# Patient Record
Sex: FEMALE | Race: BLACK OR AFRICAN AMERICAN | Employment: UNEMPLOYED | URBAN - METROPOLITAN AREA
[De-identification: names, ages, dates, MRNs, and addresses within clinical notes are randomized per-mention and may not be internally consistent; named-entity substitution may affect disease eponyms.]

---

## 2017-02-02 ENCOUNTER — OFFICE VISIT (OUTPATIENT)
Dept: OBGYN CLINIC | Age: 27
End: 2017-02-02

## 2017-02-02 ENCOUNTER — HOSPITAL ENCOUNTER (OUTPATIENT)
Dept: LAB | Age: 27
Discharge: HOME | End: 2017-02-02
Payer: COMMERCIAL

## 2017-02-02 VITALS
SYSTOLIC BLOOD PRESSURE: 125 MMHG | HEIGHT: 67 IN | DIASTOLIC BLOOD PRESSURE: 79 MMHG | BODY MASS INDEX: 23.86 KG/M2 | HEART RATE: 86 BPM | WEIGHT: 152 LBS

## 2017-02-02 DIAGNOSIS — B96.89 BV (BACTERIAL VAGINOSIS): ICD-10-CM

## 2017-02-02 DIAGNOSIS — N76.0 BV (BACTERIAL VAGINOSIS): ICD-10-CM

## 2017-02-02 DIAGNOSIS — N89.8 VAGINAL DISCHARGE: ICD-10-CM

## 2017-02-02 DIAGNOSIS — Z01.419 WELL WOMAN EXAM WITH ROUTINE GYNECOLOGICAL EXAM: Primary | ICD-10-CM

## 2017-02-02 DIAGNOSIS — Z11.3 SCREENING FOR STD (SEXUALLY TRANSMITTED DISEASE): ICD-10-CM

## 2017-02-02 DIAGNOSIS — Z01.419 WELL WOMAN EXAM WITH ROUTINE GYNECOLOGICAL EXAM: ICD-10-CM

## 2017-02-02 LAB — KOH PREP SKIN, KOHPOCT: NORMAL

## 2017-02-02 PROCEDURE — 87340 HEPATITIS B SURFACE AG IA: CPT | Performed by: OBSTETRICS & GYNECOLOGY

## 2017-02-02 PROCEDURE — 36415 COLL VENOUS BLD VENIPUNCTURE: CPT | Performed by: OBSTETRICS & GYNECOLOGY

## 2017-02-02 PROCEDURE — 87389 HIV-1 AG W/HIV-1&-2 AB AG IA: CPT | Performed by: OBSTETRICS & GYNECOLOGY

## 2017-02-02 PROCEDURE — 87491 CHLMYD TRACH DNA AMP PROBE: CPT | Performed by: OBSTETRICS & GYNECOLOGY

## 2017-02-02 PROCEDURE — 86780 TREPONEMA PALLIDUM: CPT | Performed by: OBSTETRICS & GYNECOLOGY

## 2017-02-02 PROCEDURE — 86803 HEPATITIS C AB TEST: CPT | Performed by: OBSTETRICS & GYNECOLOGY

## 2017-02-02 RX ORDER — METRONIDAZOLE 500 MG/1
500 TABLET ORAL 2 TIMES DAILY
Qty: 14 TAB | Refills: 0 | Status: SHIPPED | OUTPATIENT
Start: 2017-02-02 | End: 2017-02-09

## 2017-02-02 NOTE — PATIENT INSTRUCTIONS
Well Visit, Ages 25 to 48: Care Instructions  Your Care Instructions  Physical exams can help you stay healthy. Your doctor has checked your overall health and may have suggested ways to take good care of yourself. He or she also may have recommended tests. At home, you can help prevent illness with healthy eating, regular exercise, and other steps. Follow-up care is a key part of your treatment and safety. Be sure to make and go to all appointments, and call your doctor if you are having problems. It's also a good idea to know your test results and keep a list of the medicines you take. How can you care for yourself at home? · Reach and stay at a healthy weight. This will lower your risk for many problems, such as obesity, diabetes, heart disease, and high blood pressure. · Get at least 30 minutes of physical activity on most days of the week. Walking is a good choice. You also may want to do other activities, such as running, swimming, cycling, or playing tennis or team sports. Discuss any changes in your exercise program with your doctor. · Do not smoke or allow others to smoke around you. If you need help quitting, talk to your doctor about stop-smoking programs and medicines. These can increase your chances of quitting for good. · Talk to your doctor about whether you have any risk factors for sexually transmitted infections (STIs). Having one sex partner (who does not have STIs and does not have sex with anyone else) is a good way to avoid these infections. · Use birth control if you do not want to have children at this time. Talk with your doctor about the choices available and what might be best for you. · Protect your skin from too much sun. When you're outdoors from 10 a.m. to 4 p.m., stay in the shade or cover up with clothing and a hat with a wide brim. Wear sunglasses that block UV rays. Even when it's cloudy, put broad-spectrum sunscreen (SPF 30 or higher) on any exposed skin.   · See a dentist one or two times a year for checkups and to have your teeth cleaned. · Wear a seat belt in the car. · Drink alcohol in moderation, if at all. That means no more than 2 drinks a day for men and 1 drink a day for women. Follow your doctor's advice about when to have certain tests. These tests can spot problems early. For everyone  · Cholesterol. Have the fat (cholesterol) in your blood tested after age 21. Your doctor will tell you how often to have this done based on your age, family history, or other things that can increase your risk for heart disease. · Blood pressure. Have your blood pressure checked during a routine doctor visit. Your doctor will tell you how often to check your blood pressure based on your age, your blood pressure results, and other factors. · Vision. Talk with your doctor about how often to have a glaucoma test.  · Diabetes. Ask your doctor whether you should have tests for diabetes. · Colon cancer. Have a test for colon cancer at age 48. You may have one of several tests. If you are younger than 48, you may need a test earlier if you have any risk factors. Risk factors include whether you already had a precancerous polyp removed from your colon or whether your parent, brother, sister, or child has had colon cancer. For women  · Breast exam and mammogram. Talk to your doctor about when you should have a clinical breast exam and a mammogram. Medical experts differ on whether and how often women under 50 should have these tests. Your doctor can help you decide what is right for you. · Pap test and pelvic exam. Begin Pap tests at age 24. A Pap test is the best way to find cervical cancer. The test often is part of a pelvic exam. Ask how often to have this test.  · Tests for sexually transmitted infections (STIs). Ask whether you should have tests for STIs. You may be at risk if you have sex with more than one person, especially if your partners do not wear condoms.   For men  · Tests for sexually transmitted infections (STIs). Ask whether you should have tests for STIs. You may be at risk if you have sex with more than one person, especially if you do not wear a condom. · Testicular cancer exam. Ask your doctor whether you should check your testicles regularly. · Prostate exam. Talk to your doctor about whether you should have a blood test (called a PSA test) for prostate cancer. Experts differ on whether and when men should have this test. Some experts suggest it if you are older than 39 and are -American or have a father or brother who got prostate cancer when he was younger than 72. When should you call for help? Watch closely for changes in your health, and be sure to contact your doctor if you have any problems or symptoms that concern you. Where can you learn more? Go to http://marga-amalia.info/. Enter P072 in the search box to learn more about \"Well Visit, Ages 25 to 48: Care Instructions. \"  Current as of: July 19, 2016  Content Version: 11.1  © 2892-6650 [employer]. Care instructions adapted under license by [employer] (which disclaims liability or warranty for this information). If you have questions about a medical condition or this instruction, always ask your healthcare professional. [address] any warranty or liability for your use of this information. Safer Sex: Care Instructions  Your Care Instructions  Safer sex is a way to reduce your risk of getting an infection spread through sex. It can also help prevent pregnancy. Most infections that are spread through sex, also called sexually transmitted infections or STIs, can be cured. But some can decrease your chances of getting pregnant if they are not treated early. Others, such as herpes, have no cure. And some, such as HIV, can be deadly. Several products can help you practice safer sex and reduce your chance of STIs.  One of the best is a condom. There are condoms for men and for women. The female condom is a tube of soft plastic with a closed end that is placed deep into the vagina. You can use a special rubber sheet (dental dam) for protection during oral sex. Latex gloves can keep your hands from touching blood, semen, or other body fluids that can carry infections. Remember that birth control methods such as diaphragms, IUDs, foams, and birth control pills do not stop you from getting STIs. Follow-up care is a key part of your treatment and safety. Be sure to make and go to all appointments, and call your doctor if you are having problems. Its also a good idea to know your test results and keep a list of the medicines you take. How can you care for yourself at home? · Think about getting shots to prevent hepatitis A and hepatitis B. These two diseases can be spread through sex. You also can get hepatitis A if you eat infected food. · Use condoms or female condoms each time and every time you have sex. · Learn the right way to use a male condom:  ¨ Condoms come in several sizes. Make sure you use the right size. A condom that is too small can break easily. A condom that is too big can slip off during sex. Use a new condom each time you have sex. ¨ Be careful not to poke a hole in the condom when you open the wrapper. ¨ Squeeze the tip of the condom to keep out air. ¨ Pull down the loose skin (foreskin) from the head of an uncircumcised penis. ¨ While squeezing the tip of the condom, unroll it all the way down to the base of the firm penis. ¨ Never use petroleum jelly (such as Vaseline), grease, hand lotion, baby oil, or anything with oil in it. These products can make holes in the condom. ¨ After sex, hold the condom on your penis as you remove your penis from your partner. This will keep semen from spilling out of the condom.   · Learn to use a female condom:  ¨ You can put in a female condom up to 8 hours before sex.  Nelson Milner the smaller ring at the closed end and insert it deep into the vagina. The larger ring at the open end should stay outside the vagina. ¨ During sex, make sure the penis goes into the condom. ¨ After the penis is removed, close the open end of the condom by twisting it. Remove the condom. · Do not use a female condom and male condom at the same time. · Do not have sex with anyone who has symptoms of an STI, such as sores on the genitals or mouth. The herpes virus that causes cold sores can spread to and from the penis and vagina. · Do not drink a lot of alcohol or use drugs before sex. This can cause you to let down your guard and not practice safer sex. · Having one sex partner (who does not have STIs and does not have sex with anyone else) is a sure way to avoid STIs. · Talk to your partner before you have sex. Find out if he or she has or is at risk for any STI. Keep in mind that a person may be able to spread an STI even if he or she does not have symptoms. You and your partner may want to get an HIV test. You should get tested again 6 months later. Where can you learn more? Go to http://marga-amalia.info/. Enter H331 in the search box to learn more about \"Safer Sex: Care Instructions. \"  Current as of: May 27, 2016  Content Version: 11.1  © 2058-1623 [employer]. Care instructions adapted under license by [employer] (which disclaims liability or warranty for this information). If you have questions about a medical condition or this instruction, always ask your healthcare professional. [place] [address] any warranty or liability for your use of this information. Bacterial Vaginosis: Care Instructions  Your Care Instructions    Bacterial vaginosis is a type of vaginal infection. It is caused by excess growth of certain bacteria that are normally found in the vagina.  Symptoms can include itching, swelling, pain when you urinate or have sex, and a gray or yellow discharge with a \"fishy\" odor. It is not considered an infection that is spread through sexual contact. Although symptoms can be annoying and uncomfortable, bacterial vaginosis does not usually cause other health problems. However, if you have it while you are pregnant, it can cause complications. While the infection may go away on its own, most doctors use antibiotics to treat it. You may have been prescribed pills or vaginal cream. With treatment, bacterial vaginosis usually clears up in 5 to 7 days. Follow-up care is a key part of your treatment and safety. Be sure to make and go to all appointments, and call your doctor if you are having problems. It's also a good idea to know your test results and keep a list of the medicines you take. How can you care for yourself at home? · Take your antibiotics as directed. Do not stop taking them just because you feel better. You need to take the full course of antibiotics. · Do not eat or drink anything that contains alcohol if you are taking metronidazole (Flagyl). · Keep using your medicine if you start your period. Use pads instead of tampons while using a vaginal cream or suppository. Tampons can absorb the medicine. · Wear loose cotton clothing. Do not wear nylon and other materials that hold body heat and moisture close to the skin. · Do not scratch. Relieve itching with a cold pack or a cool bath. · Do not wash your vaginal area more than once a day. Use plain water or a mild, unscented soap. Do not douche. When should you call for help? Watch closely for changes in your health, and be sure to contact your doctor if:  · You have unexpected vaginal bleeding. · You have a fever. · You have new or increased pain in your vagina or pelvis. · You are not getting better after 1 week. · Your symptoms return after you finish the course of your medicine. Where can you learn more?   Go to http://marga-amalia.info/. Mary Jane Borrero in the search box to learn more about \"Bacterial Vaginosis: Care Instructions. \"  Current as of: February 25, 2016  Content Version: 11.1  © 9257-9837 [employer]. Care instructions adapted under license by [employer] (which disclaims liability or warranty for this information). If you have questions about a medical condition or this instruction, always ask your healthcare professional. [address] any warranty or liability for your use of this information.

## 2017-02-02 NOTE — PROGRESS NOTES
[facility]  [id number] [place] [address] Road,[address] EXAM    Name: Juvenal Seth MRN: [id number] SSN: [id number]    YOB: 1990  Age: 23 y.o. Sex: female       Subjective:      Chief complaint:    Chief Complaint   Patient presents with    Well Woman    Vaginal Discharge       Sophie Schmidt is a 23 y.o. female presents today for well visit. Complaints of frequent increased vaginal discharge with odor and irritation. Odor resolved today but discharge persist.  No pelvic pain. Requesting STD screen. Review of Systems:   General ROS: negative for - fever, chills, malaise  Endocrine ROS: negative for -  breast tenderness/mass/nipple discharge/galactorrhea, hair pattern changes, hot flashes, skin changes or temperature intolerance. Respiratory ROS: no cough, shortness of breath, or wheezing  Cardiovascular ROS: no chest pain or dyspnea on exertion  Gastrointestinal ROS: no abdominal pain, change in bowel habits, or black or bloody stools, nausea, vomiting. Positive for occasional constipation with straining and resulting tender hemorrhoids  Genito-Urinary ROS: no dysuria, trouble voiding, incontinence or hematuria. No pelvic pain, abnormal bleeding  Musculoskeletal ROS: negative  Neurological ROS: no TIA or stroke symptoms  Dermatological ROS: negative  Denies personal or FH of fractures. OB History      Para Term  AB TAB SAB Ectopic Multiple Living    0                 Gynecology:  Regular menses since menarche. Health maintenance:  no hx of abnormal pap smears. Last pap:  2015, NILM. Past Medical History   Diagnosis Date    Anemia NEC     Coarse tremors     H/O trichomoniasis     Mild scoliosis      History reviewed. No pertinent past surgical history.   Family History   Problem Relation Age of Onset    Heart Disease Father 39     massive heart attack    Heart Disease Paternal Grandfather 54     massive heart attack    Mental Retardation Brother      autism    Hypertension Maternal Grandmother      Social History     Occupational History    Not on file. Social History Main Topics    Smoking status: Former Smoker     Packs/day: 0.50     Years: 2.00     Types: Cigarettes    Smokeless tobacco: Never Used    Alcohol use No      Comment: socially    Drug use: 1.00 per week     Special: Marijuana      Comment: daily     Sexual activity: Yes     Partners: Female     Birth control/ protection: None     Family History   Problem Relation Age of Onset    Heart Disease Father 39     massive heart attack    Heart Disease Paternal Grandfather 54     massive heart attack    Mental Retardation Brother      autism    Hypertension Maternal Grandmother      No Known Allergies  Prior to Admission medications    Medication Sig Start Date End Date Taking? Authorizing Provider   metroNIDAZOLE (FLAGYL) 500 mg tablet Take 1 Tab by mouth two (2) times a day for 7 days. 2/2/17 2/9/17 Yes Rudy Black DO          Objective:     Vitals:    02/02/17 1020   BP: 125/79   Pulse: 86   Weight: 152 lb (68.9 kg)   Height: 5' 7\" (1.702 m)     Body mass index is 23.81 kg/(m^2). Physical Exam:  General appearance - well appearing, and in no distress and well hydrated  Mental status - alert, oriented to person, place, and time, normal mood, behavior, speech, dress, motor activity, and thought processes  Neck:  No lymphadenopathy, no thyroid enlargement/tenderness  Respiratory:  Normal respiratory effort, no intercostal retractions or use of accessory muscles. Breasts - nipple piercings bilaterally without surrounding erythema/drainage, otherwise breasts appear normal, no suspicious masses, no skin or nipple changes or axillary nodes  Abdomen - soft, nontender, nondistended, no masses or organomegaly  Pelvic - No inguinal lymphadenopathy, normal urethral meatus and no bladder tenderness.  VULVA: normal appearing vulva with no masses, tenderness or lesions, VAGINA: normal appearing vagina with normal color and moderate discharge, no lesions, PELVIC FLOOR EXAM: no cystocele, rectocele or prolapse noted, DNA probe for chlamydia and GC obtained, CERVIX: normal appearing cervix without discharge or lesions, UTERUS: uterus is normal size, shape, consistency and nontender, mobile, ADNEXA: normal adnexa in size, nontender and no masses, WET MOUNT done - results: KOH done, clue cells, vaginal pH is normal  External hemorrhoid, nontender. Extremities - No edema. Skin - normal coloration and turgor, no rashes, no suspicious skin lesions noted    Assessment/Plan:       ICD-10-CM ICD-9-CM    1. Well woman exam with routine gynecological exam Z01.419 V72.31 T PALLIDUM AB      HEP B SURFACE AG      HEPATITIS C AB      HIV 1/2 AG/AB, 4TH GENERATION,W RFLX CONFIRM      CT/NG/T.VAGINALIS AMPLIFICATION   2. Screening for STD (sexually transmitted disease) Z11.3 V74.5 T PALLIDUM AB      HEP B SURFACE AG      HEPATITIS C AB      HIV 1/2 AG/AB, 4TH GENERATION,W RFLX CONFIRM      CT/NG/T.VAGINALIS AMPLIFICATION   3. Vaginal discharge N89.8 623.5 AMB POC TISSUE EXAM BY KOH SLIDE OF SAMPLES FROM SKIN   4. BV (bacterial vaginosis) N76.0 616.10 metroNIDAZOLE (FLAGYL) 500 mg tablet    B96.89 041.9      Encouraged breast self-awareness. Supplement OTC vitamins or diet rich in vit D and Calcium. Current pap smear screening guidelines reviewed. Provided guidance regarding healthy lifestyle with staying nicotine-free, caloric restrictions and regular exercise. S/sx, management and prevention of vaginitis reviewed. Follow up annually/prn. Plan of care discussed. Patient expressed understanding and agreement.       Signed By:  Jung Matamoros DO     February 2, 2017

## 2017-02-03 LAB
C TRACH RRNA SPEC QL NAA+PROBE: NEGATIVE
HBV SURFACE AG SER QL: <0.1 INDEX
HBV SURFACE AG SER QL: NEGATIVE
HCV AB SER IA-ACNC: 0.09 INDEX
HCV AB SERPL QL IA: NEGATIVE
HCV COMMENT,HCGAC: NORMAL
HIV 1+2 AB+HIV1 P24 AG SERPL QL IA: NON REACTIVE
N GONORRHOEA RRNA SPEC QL NAA+PROBE: NEGATIVE
SPECIMEN SOURCE: NORMAL
T PALLIDUM AB SER QL IA: NEGATIVE
T VAGINALIS RRNA SPEC QL NAA+PROBE: NEGATIVE

## 2017-04-21 ENCOUNTER — TELEPHONE (OUTPATIENT)
Dept: FAMILY MEDICINE CLINIC | Age: 27
End: 2017-04-21

## 2017-04-21 NOTE — TELEPHONE ENCOUNTER
Received fax from Neurology Specialist stating that \"pt has been trying to contact your office to have office notes faxed to us, can you fax recent notes? \". Called Neurology specialist for signed JAVIER form to be faxed, appt scheduled for 04/21/2017 but per representative, appt cancelled. Requested if pt comes in, please have pt sign JAVIER once signed and fax, notes will be sent. This encounter will be closed.

## 2018-08-29 ENCOUNTER — HOSPITAL ENCOUNTER (OUTPATIENT)
Dept: LAB | Age: 28
Discharge: HOME | End: 2018-08-29

## 2018-08-29 PROCEDURE — 86706 HEP B SURFACE ANTIBODY: CPT | Performed by: EMERGENCY MEDICINE

## 2018-08-29 PROCEDURE — 86735 MUMPS ANTIBODY: CPT | Performed by: EMERGENCY MEDICINE

## 2018-08-29 PROCEDURE — 86787 VARICELLA-ZOSTER ANTIBODY: CPT | Performed by: EMERGENCY MEDICINE

## 2018-08-29 PROCEDURE — 86765 RUBEOLA ANTIBODY: CPT | Performed by: EMERGENCY MEDICINE

## 2018-08-29 PROCEDURE — 86762 RUBELLA ANTIBODY: CPT | Performed by: EMERGENCY MEDICINE

## 2018-08-30 LAB
HBV SURFACE AB SER QL IA: POSITIVE
HBV SURFACE AB SERPL IA-ACNC: >1000 MIU/ML
HEP BS AB COMMENT,HBSAC: NORMAL
MEV IGG SER IA-ACNC: 105 AU/ML
MUV IGG SER IA-ACNC: 57.7 AU/ML
RUBV IGG SER-IMP: NORMAL
VZV IGG SER IA-ACNC: 878 INDEX

## 2018-08-31 ENCOUNTER — HOSPITAL ENCOUNTER (OUTPATIENT)
Dept: LAB | Age: 28
Discharge: HOME | End: 2018-08-31
Payer: COMMERCIAL

## 2018-08-31 ENCOUNTER — OFFICE VISIT (OUTPATIENT)
Dept: OBGYN CLINIC | Age: 28
End: 2018-08-31

## 2018-08-31 VITALS
HEART RATE: 80 BPM | SYSTOLIC BLOOD PRESSURE: 124 MMHG | BODY MASS INDEX: 25.43 KG/M2 | WEIGHT: 162 LBS | DIASTOLIC BLOOD PRESSURE: 75 MMHG | HEIGHT: 67 IN

## 2018-08-31 DIAGNOSIS — Z20.2 STD EXPOSURE: ICD-10-CM

## 2018-08-31 DIAGNOSIS — Z01.419 WELL WOMAN EXAM: ICD-10-CM

## 2018-08-31 DIAGNOSIS — Z01.419 WELL WOMAN EXAM: Primary | ICD-10-CM

## 2018-08-31 DIAGNOSIS — N76.0 VAGINITIS AND VULVOVAGINITIS: ICD-10-CM

## 2018-08-31 LAB — T PALLIDUM AB SER QL IA: NONREACTIVE

## 2018-08-31 PROCEDURE — 87340 HEPATITIS B SURFACE AG IA: CPT | Performed by: OBSTETRICS & GYNECOLOGY

## 2018-08-31 PROCEDURE — 86803 HEPATITIS C AB TEST: CPT | Performed by: OBSTETRICS & GYNECOLOGY

## 2018-08-31 PROCEDURE — 87491 CHLMYD TRACH DNA AMP PROBE: CPT | Performed by: OBSTETRICS & GYNECOLOGY

## 2018-08-31 PROCEDURE — 86780 TREPONEMA PALLIDUM: CPT | Performed by: OBSTETRICS & GYNECOLOGY

## 2018-08-31 PROCEDURE — 87389 HIV-1 AG W/HIV-1&-2 AB AG IA: CPT | Performed by: OBSTETRICS & GYNECOLOGY

## 2018-08-31 PROCEDURE — 36415 COLL VENOUS BLD VENIPUNCTURE: CPT | Performed by: OBSTETRICS & GYNECOLOGY

## 2018-08-31 PROCEDURE — 88175 CYTOPATH C/V AUTO FLUID REDO: CPT | Performed by: OBSTETRICS & GYNECOLOGY

## 2018-08-31 RX ORDER — FLUCONAZOLE 150 MG/1
150 TABLET ORAL DAILY
Qty: 1 TAB | Refills: 0 | Status: SHIPPED | OUTPATIENT
Start: 2018-08-31 | End: 2018-09-01

## 2018-08-31 RX ORDER — FLUCONAZOLE 150 MG/1
150 TABLET ORAL DAILY
Qty: 1 TAB | Refills: 0 | Status: SHIPPED | OUTPATIENT
Start: 2018-08-31 | End: 2018-08-31

## 2018-08-31 NOTE — PROGRESS NOTES
Name: Juvenal Seth MRN: [id number]    YOB: 1990  Age: 33 y.o. Sex: female        Chief Complaint   Patient presents with    Well Woman       HPI  Patient presents for her annual exam.  She has no complaints. OB History      Para Term  AB Living    0         SAB TAB Ectopic Molar Multiple Live Births                    History   Sexual Activity    Sexual activity: Yes    Partners: Female    Birth control/ protection: None       Past Medical History:   Diagnosis Date    Anemia NEC     Coarse tremors     H/O trichomoniasis     Mild scoliosis        History reviewed. No pertinent surgical history. No Known Allergies    No current outpatient prescriptions on file prior to visit. No current facility-administered medications on file prior to visit. Social History     Social History    Marital status: SINGLE     Spouse name: N/A    Number of children: N/A    Years of education: N/A     Occupational History    Not on file.      Social History Main Topics    Smoking status: Former Smoker     Packs/day: 0.50     Years: 2.00     Types: Cigarettes    Smokeless tobacco: Never Used    Alcohol use No      Comment: socially    Drug use: No      Comment: QUIT  2 DAYS     Sexual activity: Yes     Partners: Female     Birth control/ protection: None     Other Topics Concern     Service No    Blood Transfusions No    Occupational Exposure No    Exercise No    Seat Belt Yes    Self-Exams Yes     Social History Narrative       Family History   Problem Relation Age of Onset    Heart Disease Father 39     massive heart attack    Heart Disease Paternal Grandfather 54     massive heart attack    Mental Retardation Brother      autism    Hypertension Maternal Grandmother        ROS    Visit Vitals    /75    Pulse 80    Ht 5' 7\" (1.702 m)    Wt 162 lb (73.5 kg)    LMP 2018    BMI 25.37 kg/m2       GENERAL:  Well developed, well nourished, in no distress  NEURO/PSYCHE: Grossly intact, normal mood and affect  HEENT: Normal cephalic, atraumatic, good dentition, neck supple. No thyromegaly  BREASTS: breasts appear normal, no suspicious masses, no skin or nipple changes  CV: regular rate and rhythm  LUNGS: clear to auscultation bilaterally, no wheezes, rhonchi or rales, good air entry with normal effort  ABDOMEN: + BS, soft without tenderness, no guarding, rebound or masses  EXTREMITIES: no edema or erythema noted  SKIN:  Warm, dry, no lesions  LYMPHATICS: No supraclavicular, axillary or inguinal nodes noted    PELVIC EXAM:  LABIA MAJORA: no masses, tenderness or lesions  LABIA MINORA: no masses, tenderness or lesions  CLITORIS: no masses, tenderness or lesions  URETHRA: normal appearing, no masses or tenderness  BLADDER: no fullness or tenderness  VAGINA: pink appearing vagina with physiologic discharge, no lesions   PERINEUM: no masses, tenderness or lesions  CERVIX: No CMT or lesions  UTERUS: small, mobile, nontender  ADNEXA: nontender and no masses    1. Well woman exam  OFFERED BUT DECLINES CONTRA. - T PALLIDUM AB; Future  - HEP B SURFACE AG; Future  - HEPATITIS C AB; Future  - HIV 1/2 AG/AB, 4TH GENERATION,W RFLX CONFIRM; Future  - PAP IG, CT-NG, RFX HPV [id number], [id number]); Future    2. Vaginitis and vulvovaginitis  DIFLUCAN    3. STD exposure    - T PALLIDUM AB; Future  - HEP B SURFACE AG; Future  - HEPATITIS C AB; Future  - HIV 1/2 AG/AB, 4TH GENERATION,W RFLX CONFIRM; Future  - PAP IG, CT-NG, RFX HPV [id number], [id number]); Future      Reviewed diet, weight loss, exercise, and domestic abuse. Encouraged condom use every time. Reviewed tetanus vaccine. All of her questions were discussed.     F/U AE OR PRN

## 2018-09-03 LAB
HBV SURFACE AG SER QL: <0.1 INDEX
HBV SURFACE AG SER QL: NEGATIVE
HCV AB SER IA-ACNC: 0.13 INDEX
HCV AB SERPL QL IA: NEGATIVE
HCV COMMENT,HCGAC: NORMAL
HIV 1+2 AB+HIV1 P24 AG SERPL QL IA: NONREACTIVE
HIV12 RESULT COMMENT, HHIVC: NORMAL

## 2018-09-05 LAB
C TRACH RRNA SPEC QL NAA+PROBE: NEGATIVE
N GONORRHOEA RRNA SPEC QL NAA+PROBE: NEGATIVE
SPECIMEN SOURCE: NORMAL

## 2018-09-18 ENCOUNTER — TELEPHONE (OUTPATIENT)
Dept: OBGYN CLINIC | Age: 28
End: 2018-09-18

## 2018-10-18 ENCOUNTER — OFFICE VISIT (OUTPATIENT)
Dept: OBGYN CLINIC | Age: 28
End: 2018-10-18

## 2018-10-18 VITALS
SYSTOLIC BLOOD PRESSURE: 127 MMHG | HEART RATE: 95 BPM | RESPIRATION RATE: 18 BRPM | WEIGHT: 165 LBS | OXYGEN SATURATION: 99 % | BODY MASS INDEX: 25.9 KG/M2 | DIASTOLIC BLOOD PRESSURE: 79 MMHG | HEIGHT: 67 IN

## 2018-10-18 DIAGNOSIS — N89.8 VAGINAL DISCHARGE: Primary | ICD-10-CM

## 2018-10-18 DIAGNOSIS — N76.0 BACTERIAL VAGINOSIS: ICD-10-CM

## 2018-10-18 DIAGNOSIS — B96.89 BACTERIAL VAGINOSIS: ICD-10-CM

## 2018-10-18 LAB — WET MOUNT POCT, WMPOCT: ABNORMAL

## 2018-10-18 RX ORDER — METRONIDAZOLE 500 MG/1
500 TABLET ORAL 2 TIMES DAILY
Qty: 14 TAB | Refills: 0 | Status: SHIPPED | OUTPATIENT
Start: 2018-10-18 | End: 2018-10-25

## 2018-10-18 NOTE — PROGRESS NOTES
Subjective:   33 y.o. female complains of white and malodorous vaginal discharge for 2 weeks. .  Denies abnormal vaginal bleeding or significant pelvic pain or  fever. No UTI symptoms. Denies history of known exposure to STD. Patient's last menstrual period was 10/12/2018. Objective:   She appears well, afebrile. Abdomen: benign, soft, nontender, no masses. Pelvic Exam: normal external genitalia, vulva, vagina, cervix, uterus and adnexa. Microscopic wet-mount exam shows clue cells. .    Assessment/Plan:   bacterial vaginosis  Treatment: Flagyl 500 BID x 7 days  ROV prn if symptoms persist or worsen. ICD-10-CM ICD-9-CM    1. Vaginal discharge N89.8 623.5 AMB POC SMEAR, STAIN & INTERPRET, WET MOUNT      metroNIDAZOLE (FLAGYL) 500 mg tablet   2. Bacterial vaginosis N76.0 616.10 metroNIDAZOLE (FLAGYL) 500 mg tablet    B96.89 041.9      Encounter Diagnoses   Name Primary?  Vaginal discharge Yes    Bacterial vaginosis      Orders Placed This Encounter    AMB POC SMEAR, STAIN & INTERPRET, WET MOUNT    metroNIDAZOLE (FLAGYL) 500 mg tablet     Follow-up Disposition:  Return for Annual Exam or prn. [facility]

## 2018-10-18 NOTE — PATIENT INSTRUCTIONS
Bacterial Vaginosis: Care Instructions  Your Care Instructions    Bacterial vaginosis is a type of vaginal infection. It is caused by excess growth of certain bacteria that are normally found in the vagina. Symptoms can include itching, swelling, pain when you urinate or have sex, and a gray or yellow discharge with a \"fishy\" odor. It is not considered an infection that is spread through sexual contact. Although symptoms can be annoying and uncomfortable, bacterial vaginosis does not usually cause other health problems. However, if you have it while you are pregnant, it can cause complications. While the infection may go away on its own, most doctors use antibiotics to treat it. You may have been prescribed pills or vaginal cream. With treatment, bacterial vaginosis usually clears up in 5 to 7 days. Follow-up care is a key part of your treatment and safety. Be sure to make and go to all appointments, and call your doctor if you are having problems. It's also a good idea to know your test results and keep a list of the medicines you take. How can you care for yourself at home? · Take your antibiotics as directed. Do not stop taking them just because you feel better. You need to take the full course of antibiotics. · Do not eat or drink anything that contains alcohol if you are taking metronidazole (Flagyl). · Keep using your medicine if you start your period. Use pads instead of tampons while using a vaginal cream or suppository. Tampons can absorb the medicine. · Wear loose cotton clothing. Do not wear nylon and other materials that hold body heat and moisture close to the skin. · Do not scratch. Relieve itching with a cold pack or a cool bath. · Do not wash your vaginal area more than once a day. Use plain water or a mild, unscented soap. Do not douche. When should you call for help?   Watch closely for changes in your health, and be sure to contact your doctor if:    · You have unexpected vaginal bleeding.     · You have a fever.     · You have new or increased pain in your vagina or pelvis.     · You are not getting better after 1 week.     · Your symptoms return after you finish the course of your medicine. Where can you learn more? Go to http://marga-amalia.info/. Cara Pederson in the search box to learn more about \"Bacterial Vaginosis: Care Instructions. \"  Current as of: May 15, 2018  Content Version: 11.8  © 5486-9023 Healthwise, [employer]. Care instructions adapted under license by [employer] (which disclaims liability or warranty for this information). If you have questions about a medical condition or this instruction, always ask your healthcare professional. [address] any warranty or liability for your use of this information.

## 2019-04-23 ENCOUNTER — OFFICE VISIT (OUTPATIENT)
Dept: OBGYN CLINIC | Age: 29
End: 2019-04-23

## 2019-04-23 ENCOUNTER — HOSPITAL ENCOUNTER (OUTPATIENT)
Dept: LAB | Age: 29
Discharge: HOME | End: 2019-04-23
Payer: COMMERCIAL

## 2019-04-23 VITALS
HEART RATE: 82 BPM | SYSTOLIC BLOOD PRESSURE: 111 MMHG | OXYGEN SATURATION: 96 % | BODY MASS INDEX: 27.47 KG/M2 | WEIGHT: 175 LBS | RESPIRATION RATE: 20 BRPM | HEIGHT: 67 IN | DIASTOLIC BLOOD PRESSURE: 71 MMHG

## 2019-04-23 DIAGNOSIS — B96.89 BACTERIAL VAGINOSIS: ICD-10-CM

## 2019-04-23 DIAGNOSIS — Z11.3 ROUTINE SCREENING FOR STI (SEXUALLY TRANSMITTED INFECTION): ICD-10-CM

## 2019-04-23 DIAGNOSIS — N76.0 ACUTE VAGINITIS: ICD-10-CM

## 2019-04-23 DIAGNOSIS — N76.0 ACUTE VAGINITIS: Primary | ICD-10-CM

## 2019-04-23 DIAGNOSIS — N76.0 BACTERIAL VAGINOSIS: ICD-10-CM

## 2019-04-23 LAB
HCG URINE, QL. (POC): NEGATIVE
T PALLIDUM AB SER QL IA: NONREACTIVE
VALID INTERNAL CONTROL?: YES
WET MOUNT POCT, WMPOCT: ABNORMAL

## 2019-04-23 PROCEDURE — 86803 HEPATITIS C AB TEST: CPT

## 2019-04-23 PROCEDURE — 87389 HIV-1 AG W/HIV-1&-2 AB AG IA: CPT

## 2019-04-23 PROCEDURE — 86780 TREPONEMA PALLIDUM: CPT

## 2019-04-23 PROCEDURE — 87491 CHLMYD TRACH DNA AMP PROBE: CPT

## 2019-04-23 PROCEDURE — 87340 HEPATITIS B SURFACE AG IA: CPT

## 2019-04-23 PROCEDURE — 36415 COLL VENOUS BLD VENIPUNCTURE: CPT

## 2019-04-23 RX ORDER — METRONIDAZOLE 500 MG/1
500 TABLET ORAL 2 TIMES DAILY
Qty: 14 TAB | Refills: 0 | Status: SHIPPED | OUTPATIENT
Start: 2019-04-23 | End: 2019-04-30

## 2019-04-23 NOTE — PROGRESS NOTES
Subjective:   33 y.o. female complains of malodorous vaginal discharge for 2 weeks. She had unprotected intercourse recently and is concerned about pregnancy and STIs. Denies abnormal vaginal bleeding or significant pelvic pain or  fever. No UTI symptoms. Denies history of known exposure to STD. No LMP recorded. Objective:   She appears well, afebrile. Abdomen: benign, soft, nontender, no masses. Pelvic Exam: normal external genitalia, vulva, vagina, cervix, uterus and adnexa. Microscopic wet-mount exam shows clue cells. .    Assessment/Plan:   bacterial vaginosis  GC and chlamydia DNA  probe sent to lab. Treatment: Flagyl 500 BID x 7 days  ROV prn if symptoms persist or worsen. UPT- in office today. I discussed hormonal contraception today to which she declined. ICD-10-CM ICD-9-CM    1. Acute vaginitis N76.0 616.10 T PALLIDUM AB      HEP B SURFACE AG      HEPATITIS C AB      HIV 1/2 AG/AB, 4TH GENERATION,W RFLX CONFIRM      AMB POC SMEAR, STAIN & INTERPRET, WET MOUNT   2. Routine screening for STI (sexually transmitted infection) Z11.3 V74.5 CHLAMYDIA/NEISSERIA/TRICHOMONAS AMP      AMB POC URINE PREGNANCY TEST, VISUAL COLOR COMPARISON      T PALLIDUM AB      HEP B SURFACE AG      HEPATITIS C AB      HIV 1/2 AG/AB, 4TH GENERATION,W RFLX CONFIRM   3. Bacterial vaginosis N76.0 616.10 metroNIDAZOLE (FLAGYL) 500 mg tablet    B96.89 041.9      Encounter Diagnoses   Name Primary?     Acute vaginitis Yes    Routine screening for STI (sexually transmitted infection)     Bacterial vaginosis      Orders Placed This Encounter    CHLAMYDIA/NEISSERIA/TRICHOMONAS AMP    T PALLIDUM AB    HEP B SURFACE AG    HEPATITIS C AB    HIV 1/2 AG/AB, 4TH GENERATION,W RFLX CONFIRM    AMB POC URINE PREGNANCY TEST, VISUAL COLOR COMPARISON    AMB POC SMEAR, STAIN & INTERPRET, WET MOUNT    metroNIDAZOLE (FLAGYL) 500 mg tablet     Follow-up and Dispositions    · Return if symptoms worsen or fail to improve, for Annual Exam or prn. Shania Son

## 2019-04-23 NOTE — PROGRESS NOTES
1. Have you been to the ER, urgent care clinic since your last visit? Hospitalized since your last visit? No    2. Have you seen or consulted any other health care providers outside of the [address] [place] [address] since your last visit? Include any pap smears or colon screening.  No

## 2019-04-24 LAB
C TRACH RRNA SPEC QL NAA+PROBE: NEGATIVE
HBV SURFACE AG SER QL: <0.1 INDEX
HBV SURFACE AG SER QL: NEGATIVE
HCV AB SER IA-ACNC: 0.02 INDEX
HCV AB SERPL QL IA: NEGATIVE
HCV COMMENT,HCGAC: NORMAL
HIV 1+2 AB+HIV1 P24 AG SERPL QL IA: NONREACTIVE
HIV12 RESULT COMMENT, HHIVC: NORMAL
N GONORRHOEA RRNA SPEC QL NAA+PROBE: NEGATIVE
SPECIMEN SOURCE: NORMAL
T VAGINALIS RRNA SPEC QL NAA+PROBE: NEGATIVE

## 2019-08-26 ENCOUNTER — TELEPHONE (OUTPATIENT)
Dept: FAMILY MEDICINE CLINIC | Age: 29
End: 2019-08-26

## 2019-08-26 ENCOUNTER — OFFICE VISIT (OUTPATIENT)
Dept: FAMILY MEDICINE CLINIC | Age: 29
End: 2019-08-26

## 2019-08-26 ENCOUNTER — HOSPITAL ENCOUNTER (OUTPATIENT)
Dept: LAB | Age: 29
Discharge: HOME | End: 2019-08-26
Payer: COMMERCIAL

## 2019-08-26 VITALS
BODY MASS INDEX: 26.13 KG/M2 | HEART RATE: 77 BPM | TEMPERATURE: 98.5 F | WEIGHT: 172.4 LBS | SYSTOLIC BLOOD PRESSURE: 114 MMHG | HEIGHT: 68 IN | DIASTOLIC BLOOD PRESSURE: 73 MMHG | RESPIRATION RATE: 16 BRPM | OXYGEN SATURATION: 99 %

## 2019-08-26 DIAGNOSIS — R25.1 TREMOR OF BOTH HANDS: ICD-10-CM

## 2019-08-26 DIAGNOSIS — L40.9 PSORIASIS OF SCALP: Primary | ICD-10-CM

## 2019-08-26 RX ORDER — PROPRANOLOL HYDROCHLORIDE 60 MG/1
60 CAPSULE, EXTENDED RELEASE ORAL DAILY
Qty: 30 CAP | Refills: 5 | Status: SHIPPED | OUTPATIENT
Start: 2019-08-26 | End: 2021-06-29

## 2019-08-26 RX ORDER — PROPRANOLOL HYDROCHLORIDE 60 MG/1
60 CAPSULE, EXTENDED RELEASE ORAL DAILY
Qty: 30 CAP | Refills: 5 | Status: SHIPPED | OUTPATIENT
Start: 2019-08-26 | End: 2019-08-26 | Stop reason: SDUPTHER

## 2019-08-26 RX ORDER — CLOBETASOL PROPIONATE 0.46 MG/ML
SOLUTION TOPICAL
Qty: 50 ML | Refills: 11 | Status: SHIPPED | OUTPATIENT
Start: 2019-08-26 | End: 2019-08-26 | Stop reason: SDUPTHER

## 2019-08-26 RX ORDER — CLOBETASOL PROPIONATE 0.46 MG/ML
SOLUTION TOPICAL
Qty: 50 ML | Refills: 11 | Status: SHIPPED | OUTPATIENT
Start: 2019-08-26

## 2019-08-26 NOTE — PROGRESS NOTES
HISTORY OF PRESENT ILLNESS  Giuseppe Ludwig is a 30 y.o. female. Patient presents with concerns regarding tremors. HPI  Tremors: hands. It is a subtle tremor. It does not stop when she holds an object. Drinking alcohol makes no difference. It affects her witting. She also notes dandruff. Her scalp itches. It is the worse around her ears. The flakes are big. Some times she bleeds after itching. Review of Systems   Constitutional: Negative. HENT: Negative. Respiratory: Negative. Cardiovascular: Negative. Skin: Positive for itching. Neurological: Positive for tremors. Visit Vitals  /73 (BP 1 Location: Left arm)   Pulse 77   Temp 98.5 °F (36.9 °C) (Oral)   Resp 16   Ht 5' 7.6\" (1.717 m)   Wt 172 lb 6.4 oz (78.2 kg)   LMP 08/06/2019 (Exact Date)   SpO2 99%   BMI 26.52 kg/m²     Physical Exam   Constitutional: She appears well-developed. No distress. Cardiovascular: Normal rate and regular rhythm. No murmur heard. Pulmonary/Chest: Effort normal and breath sounds normal. No respiratory distress. She has no wheezes. She has no rales. Neurological: She displays tremor (stephanie hand at rest). No cranial nerve deficit or sensory deficit.   skin: scalp there is diffuse plaque formation. ASSESSMENT and PLAN    ICD-10-CM ICD-9-CM    1. Psoriasis of scalp L40.9 696.1 clobetasol (TEMOVATE) 0.05 % external solution      DISCONTINUED: clobetasol (TEMOVATE) 0.05 % external solution   2. Tremor of both hands R25.1 781.0 REFERRAL TO NEUROLOGY      [facility] 3RD GENERATION      propranolol LA (INDERAL LA) 60 mg SR capsule      DISCONTINUED: propranolol LA (INDERAL LA) 60 mg SR capsule     PLAN:  We discussed tremors: dif dx: benign essential tremors. Pt started on Propranolol. Pt is aware of the side effects. Pt ref to neurology for further eval.    We discussed skin care. I have discussed the diagnosis with the patient and the intended plan as seen in the above orders.   The patient has received an after-visit summary and questions were answered concerning future plans. I have discussed medication side effects and warnings with the patient as well. Patient will call for further questions. Follow-up and Dispositions    · Return in about 6 months (around 2/26/2020) for chronic care.

## 2019-08-26 NOTE — PROGRESS NOTES
Pt is here for establish care  C/O hand tremors since 2012    1. Have you been to the ER, urgent care clinic since your last visit? Hospitalized since your last visit? No    2. Have you seen or consulted any other health care providers outside of the [address] [place] [address] since your last visit? Include any pap smears or colon screening.  No

## 2019-08-27 LAB — TSH SERPL DL<=0.05 MIU/L-ACNC: 0.99 UIU/ML (ref 0.36–3.74)

## 2019-08-27 PROCEDURE — 36415 COLL VENOUS BLD VENIPUNCTURE: CPT

## 2019-08-27 PROCEDURE — 84443 ASSAY THYROID STIM HORMONE: CPT

## 2019-12-27 ENCOUNTER — HOSPITAL ENCOUNTER (OUTPATIENT)
Dept: LAB | Age: 29
Discharge: HOME | End: 2019-12-27
Payer: SELF-PAY

## 2019-12-27 ENCOUNTER — OFFICE VISIT (OUTPATIENT)
Dept: OBGYN CLINIC | Age: 29
End: 2019-12-27

## 2019-12-27 VITALS
SYSTOLIC BLOOD PRESSURE: 123 MMHG | HEART RATE: 89 BPM | RESPIRATION RATE: 18 BRPM | HEIGHT: 67 IN | WEIGHT: 178.8 LBS | DIASTOLIC BLOOD PRESSURE: 69 MMHG | BODY MASS INDEX: 28.06 KG/M2

## 2019-12-27 DIAGNOSIS — N89.8 VAGINAL DISCHARGE: Primary | ICD-10-CM

## 2019-12-27 DIAGNOSIS — N89.8 VAGINAL DISCHARGE: ICD-10-CM

## 2019-12-27 PROCEDURE — 87661 TRICHOMONAS VAGINALIS AMPLIF: CPT

## 2019-12-27 NOTE — PROGRESS NOTES
Elisha Villarreal presents today for   Chief Complaint   Patient presents with    Vaginitis     room 14    Yeast Infection       Is someone accompanying this pt? no  atient does not have any concerns at this time. Patient does no want  birth control at this time . Depression screening     Is the patient using any DME equipment during OV? no    Depression Screening:  3 most recent PHQ Screens 8/26/2019   Little interest or pleasure in doing things Not at all   Feeling down, depressed, irritable, or hopeless Not at all   Total Score PHQ 2 0       Learning Assessment:  Learning Assessment 1/27/2016   PRIMARY LEARNER Patient   HIGHEST LEVEL OF EDUCATION - PRIMARY LEARNER  SOME COLLEGE   BARRIERS PRIMARY LEARNER NONE   CO-LEARNER CAREGIVER No   PRIMARY LANGUAGE ENGLISH   LEARNER PREFERENCE PRIMARY DEMONSTRATION   ANSWERED BY patient   RELATIONSHIP SELF       Abuse Screening:  Abuse Screening Questionnaire 1/27/2016   Do you ever feel afraid of your partner? N   Are you in a relationship with someone who physically or mentally threatens you? N   Is it safe for you to go home? Y       Fall Risk  No flowsheet data found. This Visit Test  Results for orders placed or performed during the hospital encounter of 08/26/19   [facility] 3RD GENERATION   Result Value Ref Range    TSH 0.99 0.36 - 3.74 uIU/mL       Health Maintenance reviewed and discussed and ordered per Provider. Health Maintenance Due   Topic Date Due    Influenza Age 5 to Adult  08/01/2019   . Coordination of Care:  1. Have you been to the ER, urgent care clinic since your last visit? Hospitalized since your last visit? no    2. Have you seen or consulted any other health care providers outside of the [address] [place] [address] since your last visit? Include any pap smears or colon screening.  no

## 2019-12-30 LAB
C TRACH RRNA SPEC QL NAA+PROBE: NEGATIVE
N GONORRHOEA RRNA SPEC QL NAA+PROBE: NEGATIVE
SPECIMEN SOURCE: NORMAL
T VAGINALIS RRNA SPEC QL NAA+PROBE: NEGATIVE

## 2020-01-07 ENCOUNTER — TELEPHONE (OUTPATIENT)
Dept: OBGYN CLINIC | Age: 30
End: 2020-01-07

## 2020-01-08 ENCOUNTER — TELEPHONE (OUTPATIENT)
Dept: OBGYN CLINIC | Age: 30
End: 2020-01-08

## 2020-01-08 DIAGNOSIS — B37.9 YEAST INFECTION: Primary | ICD-10-CM

## 2020-01-08 RX ORDER — FLUCONAZOLE 150 MG/1
150 TABLET ORAL
Qty: 2 TAB | Refills: 0 | Status: SHIPPED | OUTPATIENT
Start: 2020-01-08 | End: 2020-01-12

## 2020-01-08 NOTE — TELEPHONE ENCOUNTER
I have attempted to contact this patient by phone with the following results: left message to return my call.

## 2020-02-25 ENCOUNTER — TELEPHONE (OUTPATIENT)
Dept: FAMILY MEDICINE CLINIC | Age: 30
End: 2020-02-25

## 2020-02-25 DIAGNOSIS — R25.1 TREMOR OF BOTH HANDS: Primary | ICD-10-CM

## 2020-02-25 NOTE — TELEPHONE ENCOUNTER
Demetria Meléndez, MINDY Thomason, JAYDE   Caller: Unspecified (Today,  8:45 AM)             Let Pt know that I placed the referral.      LM for Pt that referral was placed for neurologist.

## 2020-02-25 NOTE — TELEPHONE ENCOUNTER
Patient needs to have her neurologist referral placed again. Referral was closed due to patient did not go to the appointment. please advise.

## 2020-03-11 ENCOUNTER — OFFICE VISIT (OUTPATIENT)
Dept: NEUROLOGY | Age: 30
End: 2020-03-11

## 2020-03-11 VITALS
BODY MASS INDEX: 28.66 KG/M2 | OXYGEN SATURATION: 95 % | HEART RATE: 72 BPM | WEIGHT: 182.6 LBS | TEMPERATURE: 98.1 F | DIASTOLIC BLOOD PRESSURE: 80 MMHG | SYSTOLIC BLOOD PRESSURE: 120 MMHG | HEIGHT: 67 IN

## 2020-03-11 DIAGNOSIS — R41.89 SUBJECTIVE MEMORY COMPLAINTS: Primary | ICD-10-CM

## 2020-03-11 DIAGNOSIS — R25.1 TREMOR: ICD-10-CM

## 2020-03-11 NOTE — PROGRESS NOTES
Buddy Dexter is a 30 y.o. female . presents for Tremors (bilateral hands) and New Patient   . Ms. Chisholm is a 30years old female patient with no significant past chronic medical problems came for evaluation of tremor. Tremor started in 2012 is progressively getting worse. It involves bilateral upper extremities and sometimes her trunk. She claims that it is in all positions: Resting, postural, and use activity. Started symmetrically. It gets worse when she is anxious and with people. She has difficulty holding items including carbs, spoon/fork. She has difficulty writing which is mild. No head and neck tremor. No obvious lower extremity shaking. Sometimes tremor involves the trunk. She has some changes in her speech and she has slightly stuttering. Currently following with speech therapy. She has no difficulty walking. Has some difficulty with her balance but no falls. She does not have any stiffness of her extremities. No difficulty sitting and standing from a chair. No difficulty turning in bed. Has some difficulty with her short-term memory. She is currently driving and has no problems. No difficulty remembering names. She drinks wine about 2-3 times a month and has seen some changes in the tremor (mild improvement). No family history of tremors. She was started on propranolol 60 mg p.o. per day by her PCP but she is not good taking it properly. She lives by herself. Denied any stress. 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 Review of Systems   Constitutional: Negative for chills, diaphoresis, fever, malaise/fatigue and weight loss. HENT: Negative for hearing loss and tinnitus. Eyes: Negative for blurred vision and double vision. Respiratory: Negative for cough, shortness of breath and wheezing. Cardiovascular: Positive for palpitations. Negative for chest pain and leg swelling. Gastrointestinal: Positive for constipation. Negative for diarrhea, heartburn, nausea and vomiting. Genitourinary: Negative for dysuria, frequency, hematuria and urgency. Musculoskeletal: Negative for back pain, falls, joint pain, myalgias and neck pain. Skin: Negative for itching and rash. Neurological: Positive for tremors and speech change (tuttering). Negative for dizziness, tingling, sensory change, focal weakness, weakness and headaches. Endo/Heme/Allergies: Does not bruise/bleed easily. Psychiatric/Behavioral: Positive for depression and memory loss (mild). Negative for suicidal ideas. The patient does not have insomnia. Past Medical History:   Diagnosis Date    Anemia NEC     Coarse tremors     H/O trichomoniasis     Mild scoliosis        History reviewed. No pertinent surgical history. Family History   Problem Relation Age of Onset    Heart Disease Father 39        massive heart attack    Heart Disease Paternal Grandfather 54        massive heart attack    No Known Problems Mother     Mental Retardation Brother         autism    Hypertension Maternal Grandmother         Social History     Socioeconomic History    Marital status: SINGLE     Spouse name: Not on file    Number of children: Not on file    Years of education: Not on file    Highest education level: Not on file   Occupational History    Not on file   Social Needs    Financial resource strain: Not on file    Food insecurity     Worry: Not on file     Inability: Not on file    Transportation needs     Medical: Not on file     Non-medical: Not on file   Tobacco Use    Smoking status: Former Smoker     Packs/day: 0.50     Years: 2.00     Pack years: 1.00     Types: Cigarettes    Smokeless tobacco: Never Used   Substance and Sexual Activity    Alcohol use:  Yes     Alcohol/week: 0.0 standard drinks     Comment: socially    Drug use: Not Currently     Frequency: 1.0 times per week     Types: Marijuana    Sexual activity: Yes     Partners: Male     Birth control/protection: None   Lifestyle    Physical activity     Days per week: Not on file     Minutes per session: Not on file    Stress: Not on file   Relationships    Social connections     Talks on phone: Not on file     Gets together: Not on file     Attends Advent service: Not on file     Active member of club or organization: Not on file     Attends meetings of clubs or organizations: Not on file     Relationship status: Not on file    Intimate partner violence     Fear of current or ex partner: Not on file     Emotionally abused: Not on file     Physically abused: Not on file     Forced sexual activity: Not on file   Other Topics Concern     Service No    Blood Transfusions No    Caffeine Concern Not Asked    Occupational Exposure No    Hobby Hazards Not Asked    Sleep Concern Not Asked    Stress Concern Not Asked    Weight Concern Not Asked    Special Diet Not Asked    Back Care Not Asked    Exercise No    Bike Helmet Not Asked    Seat Belt Yes    Self-Exams Yes   Social History Narrative    Not on file        No Known Allergies      Current Outpatient Medications   Medication Sig Dispense Refill    clobetasol (TEMOVATE) 0.05 % external solution Use bid 50 mL 11    propranolol LA (INDERAL LA) 60 mg SR capsule Take 1 Cap by mouth daily. 30 Cap 5         Physical Exam  Constitutional:       Appearance: Normal appearance. HENT:      Head: Atraumatic. Mouth/Throat:      Mouth: Mucous membranes are moist.      Pharynx: Oropharynx is clear. No oropharyngeal exudate. Eyes:      Extraocular Movements: Extraocular movements intact. Neck:      Musculoskeletal: Normal range of motion and neck supple. Cardiovascular:      Rate and Rhythm: Tachycardia present. Heart sounds: No murmur. No gallop. Pulmonary:      Effort: Pulmonary effort is normal. No respiratory distress. Breath sounds: Normal breath sounds.  No wheezing or rales. Musculoskeletal: Normal range of motion. Left lower leg: No edema. Neurological:      Mental Status: She is alert. Comments: Mental status: Awake, alert, oriented x3, follows simple, complex and inverted commands, no neglect, no extinction to DSS or VSS, immediate recall 3/3 and delayed recall 1/3  Speech and languge: fluent, coherent,and comprehension intact  CN: VFF, EOMI, PERRLA, face sensation intact , no facial asymmetry noted, palate elevation symmetric bilat, SS+SCM 5/5 bilat, tongue midline  Motor: no pronator drift, tone normal throughout, strength 5/5 throughout  Sensory: intact to light touch throughout  Coordination: FNF, HS accurate w/o dysmetria. There is postural tremor of the UE and mild kinetic tremor. No head and neck tremor. DTR: 2+ throughout, toes downgoing BL  Gait: normal.             Hospital Outpatient Visit on 12/27/2019   Component Date Value Ref Range Status    Chlamydia amplification 12/27/2019 NEGATIVE   NEG   Final    N. gonorrhoeae amplification 12/27/2019 NEGATIVE   NEG   Final    Trichomonas amplification 12/27/2019 NEGATIVE   NEG   Final    Testing performed by nucleic acid amplification method.  Specimen Source 12/27/2019 UROGENITAL SWAB    Final             ICD-10-CM ICD-9-CM    1. Subjective memory complaints R41.89 780.93 CT HEAD WO CONT   2. Tremor R25.1 781.0 CT HEAD WO CONT    Possibly essential       A 30years old female patient with above medical problems came for evaluation of tremor. The tremors are mostly over the upper extremities and bilateral.  She claims that the tremor is in any position but is mostly when she is trying to do something. She has some limitations in her activities because of the tremor. People have commented on her shaking. On physical examination she has postural tremor of the upper extremities and mild kinetic tremor. Reviewed her previous TSH which is normal.  Most likely has essential tremor.   No family history of tremors. Patient has some short-term memory problems. I have encouraged her to continue with the propranolol 60 mg p.o. per day. Will get CT scan of the head. We will see her in about 3 months time.

## 2020-03-11 NOTE — LETTER
3/11/20 Patient: Reilly Headings YOB: 1990 Date of Visit: 3/11/2020 Eze Castillo NP 
[address] [facility] [address] [place] [address] VIA In Basket Dear Eze Castillo NP, Thank you for referring Ms. Radha Larsen to [address] [place] [address] for evaluation. My notes for this consultation are attached. If you have questions, please do not hesitate to call me. I look forward to following your patient along with you. Sincerely, Arsh Patino MD

## 2020-03-11 NOTE — PATIENT INSTRUCTIONS
Benign Essential Tremor: Care Instructions Your Care Instructions Benign essential tremor is a medical term for shaking that you can't control. Your hand or fingers may shake when you lift a cup or point at something. Or your voice may shake when you speak. This type of tremor is not harmful. It is not caused by a stroke or Parkinson's disease. Some things can affect how much you shake. For example, drinking or eating something with caffeine may make tremors worse for a while. Some medicines also can increase tremors. These include antidepressants and too much thyroid replacement. Talk to your doctor if you think one of your medicines makes your tremors worse. If you are self-conscious about your tremors, there are some things you can do to reduce them or make them less noticeable. This includes taking medicine. Follow-up care is a key part of your treatment and safety. Be sure to make and go to all appointments, and call your doctor if you are having problems. It's also a good idea to know your test results and keep a list of the medicines you take. How can you care for yourself at home? · Take your medicines exactly as prescribed. Call your doctor if you think you are having a problem with your medicine. Some medicines that help control tremors have to be taken every day, even if you are not having tremors. You will get more details on the specific medicines your doctor prescribes. · Get plenty of rest. 
· Eat a balanced, healthy diet. · Try to reduce stress. Regular exercise and massages may help. · Limit alcohol. Heavy drinking can make your tremors worse. · Avoid drinks or foods with caffeine if they make your tremors worse. These include tea, cola, coffee, and chocolate. · Wear a heavy bracelet or watch. This adds a little weight to your hand. The extra weight may reduce tremors. · Drink from cups or glasses that are only half full. You may also want to try drinking with a straw. When should you call for help? Watch closely for changes in your health, and be sure to contact your doctor if: 
  · You notice your tremors are getting worse.  
  · You can't do your everyday activities because of your tremors.  
  · You are sad and embarrassed about your shaking. Where can you learn more? Go to http://marga-amalia.info/. Enter B746 in the search box to learn more about \"Benign Essential Tremor: Care Instructions. \" Current as of: March 28, 2019 Content Version: 12.2 © 6568-9620 [employer]. Care instructions adapted under license by [employer] (which disclaims liability or warranty for this information). If you have questions about a medical condition or this instruction, always ask your healthcare professional. [address] any warranty or liability for your use of this information.

## 2020-03-13 ENCOUNTER — OFFICE VISIT (OUTPATIENT)
Dept: OBGYN CLINIC | Age: 30
End: 2020-03-13

## 2020-03-13 VITALS
HEART RATE: 74 BPM | RESPIRATION RATE: 18 BRPM | TEMPERATURE: 97.2 F | BODY MASS INDEX: 28.09 KG/M2 | DIASTOLIC BLOOD PRESSURE: 61 MMHG | WEIGHT: 179 LBS | SYSTOLIC BLOOD PRESSURE: 114 MMHG | HEIGHT: 67 IN

## 2020-03-13 DIAGNOSIS — N89.8 VAGINAL DISCHARGE: ICD-10-CM

## 2020-03-13 DIAGNOSIS — B37.9 ANTIBIOTIC-INDUCED YEAST INFECTION: ICD-10-CM

## 2020-03-13 DIAGNOSIS — B96.89 BACTERIAL VAGINOSIS: Primary | ICD-10-CM

## 2020-03-13 DIAGNOSIS — N76.0 BACTERIAL VAGINOSIS: Primary | ICD-10-CM

## 2020-03-13 DIAGNOSIS — T36.95XA ANTIBIOTIC-INDUCED YEAST INFECTION: ICD-10-CM

## 2020-03-13 RX ORDER — FLUCONAZOLE 150 MG/1
150 TABLET ORAL DAILY
Qty: 1 TAB | Refills: 3 | Status: SHIPPED | OUTPATIENT
Start: 2020-03-13 | End: 2020-03-13

## 2020-03-13 RX ORDER — FLUCONAZOLE 150 MG/1
150 TABLET ORAL DAILY
Qty: 1 TAB | Refills: 3 | Status: SHIPPED | OUTPATIENT
Start: 2020-03-13 | End: 2020-03-14

## 2020-03-13 RX ORDER — SECNIDAZOLE 2 G/4.8G
2 GRANULE ORAL ONCE
Qty: 1 PACKET | Refills: 3 | Status: SHIPPED | OUTPATIENT
Start: 2020-03-13 | End: 2020-03-13

## 2020-03-13 NOTE — PROGRESS NOTES
SUBJECTIVE:   30 y.o. female for annual routine Pap and checkup. Current Outpatient Medications   Medication Sig Dispense Refill    clobetasol (TEMOVATE) 0.05 % external solution Use bid 50 mL 11    propranolol LA (INDERAL LA) 60 mg SR capsule Take 1 Cap by mouth daily. 30 Cap 5     Allergies: Patient has no known allergies. Patient's last menstrual period was 02/14/2020. ROS:  Feeling well. No dyspnea or chest pain on exertion. No abdominal pain, change in bowel habits, black or bloody stools. No urinary tract symptoms. GYN ROS: normal menses, no abnormal bleeding, pelvic pain or discharge, no breast pain or new or enlarging lumps on self exam. No neurological complaints. OBJECTIVE:   The patient appears well, alert, oriented x 3, in no distress. Visit Vitals  /61   Pulse 74   Temp 97.2 °F (36.2 °C) (Oral)   Resp 18   Ht 5' 7\" (1.702 m)   Wt 179 lb (81.2 kg)   LMP 02/14/2020   BMI 28.04 kg/m²         BREAST EXAM: breasts appear normal, no suspicious masses, no skin or nipple changes or axillary nodes    PELVIC EXAM: normal external genitalia, vulva, vagina, cervix, uterus and adnexa    ASSESSMENT:   Well woman    PLAN:   Pap smear not needed until next year  Nu swab vaginitis collected today  +clinical BV- will prescribed Solosec  Will prescribed diflucan for yeast prophylaxis.

## 2020-03-13 NOTE — PATIENT INSTRUCTIONS
Well Visit, Ages 25 to 48: Care Instructions  Your Care Instructions    Physical exams can help you stay healthy. Your doctor has checked your overall health and may have suggested ways to take good care of yourself. He or she also may have recommended tests. At home, you can help prevent illness with healthy eating, regular exercise, and other steps. Follow-up care is a key part of your treatment and safety. Be sure to make and go to all appointments, and call your doctor if you are having problems. It's also a good idea to know your test results and keep a list of the medicines you take. How can you care for yourself at home? · Reach and stay at a healthy weight. This will lower your risk for many problems, such as obesity, diabetes, heart disease, and high blood pressure. · Get at least 30 minutes of physical activity on most days of the week. Walking is a good choice. You also may want to do other activities, such as running, swimming, cycling, or playing tennis or team sports. Discuss any changes in your exercise program with your doctor. · Do not smoke or allow others to smoke around you. If you need help quitting, talk to your doctor about stop-smoking programs and medicines. These can increase your chances of quitting for good. · Talk to your doctor about whether you have any risk factors for sexually transmitted infections (STIs). Having one sex partner (who does not have STIs and does not have sex with anyone else) is a good way to avoid these infections. · Use birth control if you do not want to have children at this time. Talk with your doctor about the choices available and what might be best for you. · Protect your skin from too much sun. When you're outdoors from 10 a.m. to 4 p.m., stay in the shade or cover up with clothing and a hat with a wide brim. Wear sunglasses that block UV rays. Even when it's cloudy, put broad-spectrum sunscreen (SPF 30 or higher) on any exposed skin.   · See a dentist one or two times a year for checkups and to have your teeth cleaned. · Wear a seat belt in the car. Follow your doctor's advice about when to have certain tests. These tests can spot problems early. For everyone  · Cholesterol. Have the fat (cholesterol) in your blood tested after age 21. Your doctor will tell you how often to have this done based on your age, family history, or other things that can increase your risk for heart disease. · Blood pressure. Have your blood pressure checked during a routine doctor visit. Your doctor will tell you how often to check your blood pressure based on your age, your blood pressure results, and other factors. · Vision. Talk with your doctor about how often to have a glaucoma test.  · Diabetes. Ask your doctor whether you should have tests for diabetes. · Colon cancer. Your risk for colorectal cancer gets higher as you get older. Some experts say that adults should start regular screening at age 48 and stop at age 76. Others say to start before age 48 or continue after age 76. Talk with your doctor about your risk and when to start and stop screening. For women  · Breast exam and mammogram. Talk to your doctor about when you should have a clinical breast exam and a mammogram. Medical experts differ on whether and how often women under 50 should have these tests. Your doctor can help you decide what is right for you. · Cervical cancer screening test and pelvic exam. Begin with a Pap test at age 24. The test often is part of a pelvic exam. Starting at age 27, you may choose to have a Pap test, an HPV test, or both tests at the same time (called co-testing). Talk with your doctor about how often to have testing. · Tests for sexually transmitted infections (STIs). Ask whether you should have tests for STIs. You may be at risk if you have sex with more than one person, especially if your partners do not wear condoms.   For men  · Tests for sexually transmitted infections (STIs). Ask whether you should have tests for STIs. You may be at risk if you have sex with more than one person, especially if you do not wear a condom. · Testicular cancer exam. Ask your doctor whether you should check your testicles regularly. · Prostate exam. Talk to your doctor about whether you should have a blood test (called a PSA test) for prostate cancer. Experts differ on whether and when men should have this test. Some experts suggest it if you are older than 39 and are -American or have a father or brother who got prostate cancer when he was younger than 72. When should you call for help? Watch closely for changes in your health, and be sure to contact your doctor if you have any problems or symptoms that concern you. Where can you learn more? Go to http://marga-amalia.info/  Enter P072 in the search box to learn more about \"Well Visit, Ages 25 to 48: Care Instructions. \"  Current as of: August 21, 2019Content Version: 12.4  © 7232-0127 [employer]. Care instructions adapted under license by [employer] (which disclaims liability or warranty for this information). If you have questions about a medical condition or this instruction, always ask your healthcare professional. [address] any warranty or liability for your use of this information.

## 2020-03-17 LAB
BACTERIAL VAGINOSIS, NAA: POSITIVE
CANDIDA ALBICANS, NAA, 180056: POSITIVE
CANDIDA GLABRATA, NAA, 180057: NEGATIVE
CANDIDA KRUSEI, NAA, 180016: NEGATIVE
CHLAMYDIA TRACHOMATIS, NAA, 180097: NEGATIVE
NEISSERIA GONORRHOEAE, NAA, 180104: NEGATIVE
TRICH VAG BY NAA, 180087: NEGATIVE

## 2020-04-29 DIAGNOSIS — B96.89 BACTERIAL VAGINOSIS: Primary | ICD-10-CM

## 2020-04-29 DIAGNOSIS — N76.0 BACTERIAL VAGINOSIS: Primary | ICD-10-CM

## 2020-04-29 RX ORDER — METRONIDAZOLE 500 MG/1
500 TABLET ORAL
Qty: 6 TAB | Refills: 0 | Status: SHIPPED | OUTPATIENT
Start: 2020-04-29 | End: 2020-06-04

## 2020-04-29 RX ORDER — METRONIDAZOLE 500 MG/1
500 TABLET ORAL 2 TIMES DAILY
Qty: 14 TAB | Refills: 0 | Status: SHIPPED | OUTPATIENT
Start: 2020-04-29 | End: 2020-05-06

## 2020-05-29 ENCOUNTER — HOSPITAL ENCOUNTER (OUTPATIENT)
Dept: CT IMAGING | Age: 30
Discharge: HOME | End: 2020-05-29
Attending: STUDENT IN AN ORGANIZED HEALTH CARE EDUCATION/TRAINING PROGRAM
Payer: COMMERCIAL

## 2020-05-29 DIAGNOSIS — R25.1 TREMOR: ICD-10-CM

## 2020-05-29 DIAGNOSIS — R41.89 SUBJECTIVE MEMORY COMPLAINTS: ICD-10-CM

## 2020-05-29 PROCEDURE — 70450 CT HEAD/BRAIN W/O DYE: CPT

## 2020-06-10 ENCOUNTER — VIRTUAL VISIT (OUTPATIENT)
Dept: NEUROLOGY | Age: 30
End: 2020-06-10

## 2020-06-10 DIAGNOSIS — G25.0 ESSENTIAL TREMOR: Primary | ICD-10-CM

## 2020-06-10 DIAGNOSIS — F41.9 ANXIETY: ICD-10-CM

## 2020-06-10 RX ORDER — FLUOXETINE HYDROCHLORIDE 20 MG/1
20 CAPSULE ORAL DAILY
Qty: 30 CAP | Refills: 0 | Status: SHIPPED | OUTPATIENT
Start: 2020-06-10 | End: 2020-07-10

## 2020-06-10 NOTE — PROGRESS NOTES
Ed Coronado presents today for   Chief Complaint   Patient presents with    Tremors     follow up    Memory Loss     follow up        Is someone accompanying this pt? Virtual visit [phone]    Is the patient using any DME equipment during OV? no    Depression Screening:  3 most recent PHQ Screens 3/11/2020   Little interest or pleasure in doing things Not at all   Feeling down, depressed, irritable, or hopeless Not at all   Total Score PHQ 2 0       Learning Assessment:  Learning Assessment 1/27/2016   PRIMARY LEARNER Patient   HIGHEST LEVEL OF EDUCATION - PRIMARY LEARNER  SOME COLLEGE   BARRIERS PRIMARY LEARNER NONE   CO-LEARNER CAREGIVER No   PRIMARY LANGUAGE ENGLISH   LEARNER PREFERENCE PRIMARY DEMONSTRATION   ANSWERED BY patient   RELATIONSHIP SELF       Abuse Screening:  Abuse Screening Questionnaire 1/27/2016   Do you ever feel afraid of your partner? N   Are you in a relationship with someone who physically or mentally threatens you? N   Is it safe for you to go home? Y       Fall Risk  No flowsheet data found. Coordination of Care:  1. Have you been to the ER, urgent care clinic since your last visit? Hospitalized since your last visit? no    2. Have you seen or consulted any other health care providers outside of the [address] [place] [address] since your last visit? Include any pap smears or colon screening.  no

## 2020-06-10 NOTE — PROGRESS NOTES
Delbert Lezama is a 30 y.o. female who was seen by synchronous (real-time) audio-video technology on 6/10/2020. Consent: Delbert Lezama, who was seen by synchronous (real-time) audio-video technology, and/or her healthcare decision maker, is aware that this patient-initiated, Telehealth encounter on 6/10/2020 is a billable service, with coverage as determined by her insurance carrier. She is aware that she may receive a bill and has provided verbal consent to proceed: Yes. Assessment & Plan:   Diagnoses and all orders for this visit:    1. Essential tremor    2. Anxiety  -     FLUoxetine (PROzac) 20 mg capsule; Take 1 Cap by mouth daily for 30 days. Since patient did not have any changes with the propranolol, though it is a lower dose, we will observe her for some time. We will consider putting her back on either a higher dose of propranolol or primidone if the tremor gets worse. Since she has some mild to moderate anxiety which might contribute to the tremor, I will start her on fluoxetine 20 mg p.o. per day. We will try it for 1 month and she will call our office. If the tremor gets better, we will continue with the low-dose of fluoxetine. We will see her in 3 months time. Subjective:   Delbert Lezama is a 30 y.o. female who was seen for No chief complaint on file. A 30years old female patient with no significant past chronic medical problems here for follow-up of tremor. This is a virtual/encounter. Currently on propranolol 60 mg p.o. per day. She claims that there is no change with the propranolol. She tried to stop it for some time and did not observe any worsening. It involves the upper extremities. Mostly when trying to do something: Putting eyelashes, holding something like when trying to drink from a cup, holding thermometer at her workplace, and holding utensils. No resting tremor. No head and neck tremor. Has noticed that her legs occasionally shake. Tremor gets worse when is with people and with stress. Relatively better at home. She has moderate anxiety. No difficulty walking. Sometimes feels off balance. No falls. No arm or leg weakness. Her last TSH is normal.  Had a CT scan of the brain during her last visit which was unremarkable. Prior to Admission medications    Medication Sig Start Date End Date Taking? Authorizing Provider   clobetasol (TEMOVATE) 0.05 % external solution Use bid 8/26/19   Skye Sinha NP   propranolol LA (INDERAL LA) 60 mg SR capsule Take 1 Cap by mouth daily. 8/26/19   Skye Sinha, NP     No Known Allergies        Review of Systems   Constitutional: Negative for chills, fever, malaise/fatigue and weight loss. HENT: Negative for hearing loss and tinnitus. Eyes: Negative for blurred vision and double vision. Respiratory: Negative for cough and shortness of breath. Cardiovascular: Negative for chest pain and leg swelling. Gastrointestinal: Negative for heartburn, nausea and vomiting. Genitourinary: Negative for dysuria, frequency and urgency. Musculoskeletal: Negative for back pain, myalgias and neck pain. Skin: Positive for itching. Negative for rash. Neurological: Positive for tremors. Negative for dizziness, tingling, focal weakness, seizures and headaches. Endo/Heme/Allergies: Does not bruise/bleed easily. Psychiatric/Behavioral: Negative for depression. The patient is nervous/anxious (somtimes). Objective:   Vital Signs: (As obtained by patient/caregiver at home)  There were no vitals taken for this visit.      [INSTRUCTIONS:  \"[x]\" Indicates a positive item  \"[]\" Indicates a negative item  -- DELETE ALL ITEMS NOT EXAMINED]    Constitutional: [] Appears well-developed and well-nourished [x] No apparent distress      [] Abnormal -     Mental status: [x] Alert and awake  [x] Oriented to person/place/time [x] Able to follow commands    [] Abnormal -     Eyes:   EOM    [x]  Normal    [] Abnormal -   Sclera  []  Normal    [] Abnormal -          Discharge []  None visible   [] Abnormal -     HENT: [x] Normocephalic, atraumatic  [] Abnormal -   [] Mouth/Throat: Mucous membranes are moist    External Ears [] Normal  [] Abnormal -    Neck: [x] No visualized mass [] Abnormal -     Pulmonary/Chest: [] Respiratory effort normal   [x] No visualized signs of difficulty breathing or respiratory distress        [] Abnormal -      Musculoskeletal:   [x] Normal gait with no signs of ataxia         [x] Normal range of motion of neck        [] Abnormal -     Neurological:        [x] No Facial Asymmetry (Cranial nerve 7 motor function) (limited exam due to video visit)          [x] No gaze palsy        [] Abnormal -      Mental status: Awake, alert, oriented x3, follows simple and complex commands.   Speech and languge: fluent, coherent,   and comprehension intact  CN:   EOMI,  no facial asymmetry noted, moves her head from side to side with no difficulty, intact shoulder shrug, tongue midline. Motor: no pronator drift, symmetric movement of the upper and lower extremities, normal gait.    Coordination: Postural and kinetic tremor of the upper extremities; no resting tremor; intact rapid alternating movement, able to touch her nose and stretch out her arms with some worsening of tremor as interested target. Gait: normal.     Able to do tandem walking. Skin:        [x] No significant exanthematous lesions or discoloration noted on facial skin         [] Abnormal -            Psychiatric:       [x] Normal Affect [] Abnormal -        [x] No Hallucinations    Other pertinent observable physical exam findings:-        We discussed the expected course, resolution and complications of the diagnosis(es) in detail. Medication risks, benefits, costs, interactions, and alternatives were discussed as indicated. I advised her to contact the office if her condition worsens, changes or fails to improve as anticipated. She expressed understanding with the diagnosis(es) and plan. Gustavo Rosado is a 30 y.o. female who was evaluated by a video visit encounter for concerns as above. Patient identification was verified prior to start of the visit. A caregiver was present when appropriate. Due to this being a TeleHealth encounter ([facility]), evaluation of the following organ systems was limited: Vitals/Constitutional/EENT/Resp/CV/GI//MS/Neuro/Skin/Heme-Lymph-Imm. Pursuant to the emergency declaration under the [address] [place] [address], [facility] waiver authority and the [employer] Act, this Virtual  Visit was conducted, with patient's (and/or legal guardian's) consent, to reduce the patient's risk of exposure to COVID-19 and provide necessary medical care. Services were provided through a video synchronous discussion virtually to substitute for in-person clinic visit. Patient and provider were located at their individual homes.       Noni Solo MD

## 2020-07-01 ENCOUNTER — NURSE TRIAGE (OUTPATIENT)
Dept: OTHER | Facility: CLINIC | Age: 30
End: 2020-07-01

## 2020-07-02 NOTE — TELEPHONE ENCOUNTER
Reason for Disposition   [1] COVID-19 infection suspected by caller or triager AND [2] mild symptoms (cough, fever, or others) AND [2] no complications or SOB    Answer Assessment - Initial Assessment Questions  1. COVID-19 DIAGNOSIS: \"Who made your Coronavirus (COVID-19) diagnosis? \" \"Was it confirmed by a positive lab test?\" If not diagnosed by a HCP, ask \"Are there lots of cases (community spread) where you live? \" (See [facility] health department website, if unsure)      Not tested yet  2. ONSET: \"When did the COVID-19 symptoms start? \"       Cough started about a week ago, loss of smell and taste tonight  3. WORST SYMPTOM: \"What is your worst symptom? \" (e.g., cough, fever, shortness of breath, muscle aches)      Cough, muscle aching  4. COUGH: \"Do you have a cough? \" If so, ask: \"How bad is the cough? \"        minimal  5. FEVER: \"Do you have a fever? \" If so, ask: \"What is your temperature, how was it measured, and when did it start? \"      no  6. RESPIRATORY STATUS: \"Describe your breathing? \" (e.g., shortness of breath, wheezing, unable to speak)       Cough, slightly SOB  7. BETTER-SAME-WORSE: Raphael Felty you getting better, staying the same or getting worse compared to yesterday? \"  If getting worse, ask, \"In what way? \"      Getting worse  8. HIGH RISK DISEASE: \"Do you have any chronic medical problems? \" (e.g., asthma, heart or lung disease, weak immune system, etc.)      n/a  9. PREGNANCY: \"Is there any chance you are pregnant? \" \"When was your last menstrual period? \"      6/11/2020  10. OTHER SYMPTOMS: \"Do you have any other symptoms? \"  (e.g., chills, fatigue, headache, loss of smell or taste, muscle pain, sore throat)        Loss of smell and taste    Protocols used: CORONAVIRUS (COVID-19) DIAGNOSED OR SUSPECTED-ADULT-AH

## 2021-06-29 ENCOUNTER — OFFICE VISIT (OUTPATIENT)
Dept: NEUROLOGY | Age: 31
End: 2021-06-29
Payer: MEDICAID

## 2021-06-29 VITALS
SYSTOLIC BLOOD PRESSURE: 122 MMHG | TEMPERATURE: 96.8 F | DIASTOLIC BLOOD PRESSURE: 70 MMHG | HEIGHT: 67 IN | BODY MASS INDEX: 26.9 KG/M2 | OXYGEN SATURATION: 99 % | WEIGHT: 171.4 LBS | HEART RATE: 63 BPM | RESPIRATION RATE: 18 BRPM

## 2021-06-29 DIAGNOSIS — G25.0 ESSENTIAL TREMOR: Primary | ICD-10-CM

## 2021-06-29 PROCEDURE — 99213 OFFICE O/P EST LOW 20 MIN: CPT | Performed by: STUDENT IN AN ORGANIZED HEALTH CARE EDUCATION/TRAINING PROGRAM

## 2021-06-29 RX ORDER — PROPRANOLOL HYDROCHLORIDE 60 MG/1
60 CAPSULE, EXTENDED RELEASE ORAL DAILY
Qty: 30 CAPSULE | Refills: 3 | Status: SHIPPED | OUTPATIENT
Start: 2021-06-29 | End: 2021-07-29

## 2021-06-29 NOTE — PROGRESS NOTES
Mar Vela is a 35 y.o. female . presents for Tremors   . A 35years old male patient here for follow-up of tremor. She was last in the clinic in June 2020 (virtual encounter). She was given fluoxetine which only took it for some time and stopped it. She not taking any medication currently. The tremor is about the same. Involves bilateral hands. Is worse with activities. Has difficulty writing. Some difficulty holding cups. No changes with alcohol. No head and neck tremor. No tremor over the lower extremities but she continuously moves her legs. She claims she has some difficulty with her balance. No falls. She is able to speak loud breath sometimes might stutter. Currently, she denies anxiety but occasionally might become anxious. Tremor might get worse when she is with people. From initial encounter:  Ms. Rajendra Lockwood is a 30years old female patient with no significant past chronic medical problems came for evaluation of tremor. Tremor started in 2012 is progressively getting worse. It involves bilateral upper extremities and sometimes her trunk. She claims that it is in all positions: Resting, postural, and use activity. Started symmetrically. It gets worse when she is anxious and with people. She has difficulty holding items including carbs, spoon/fork. She has difficulty writing which is mild. No head and neck tremor. No obvious lower extremity shaking. Sometimes tremor involves the trunk. She has some changes in her speech and she has slightly stuttering. Currently following with speech therapy. She has no difficulty walking. Has some difficulty with her balance but no falls. She does not have any stiffness of her extremities. No difficulty sitting and standing from a chair. No difficulty turning in bed. Has some difficulty with her short-term memory. She is currently driving and has no problems. No difficulty remembering names.   She drinks wine about 2-3 times a month and has seen some changes in the tremor (mild improvement). No family history of tremors. She was started on propranolol 60 mg p.o. per day by her PCP but she is not good taking it properly. She lives by herself. Denied any stress. 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 Tremors  Pertinent negatives include no chest pain, no headaches and no shortness of breath. Review of Systems   Constitutional: Negative for chills, fever and weight loss. HENT: Negative for hearing loss and tinnitus. Eyes: Negative for blurred vision and double vision. Respiratory: Negative for cough, shortness of breath and wheezing. Cardiovascular: Negative for chest pain, palpitations and leg swelling. Gastrointestinal: Negative for heartburn, nausea and vomiting. Genitourinary: Negative for dysuria, frequency and urgency. Musculoskeletal: Negative for back pain, falls, joint pain, myalgias and neck pain. Skin: Negative for itching and rash. Neurological: Positive for tremors and speech change (tuttering). Negative for dizziness, tingling, sensory change, focal weakness, weakness and headaches. Endo/Heme/Allergies: Does not bruise/bleed easily. Psychiatric/Behavioral: Positive for depression. Negative for suicidal ideas. Memory loss: mild. The patient does not have insomnia. Past Medical History:   Diagnosis Date    Anemia NEC     Coarse tremors     H/O trichomoniasis     Mild scoliosis        No past surgical history on file.      Family History   Problem Relation Age of Onset    Heart Disease Father 39        massive heart attack    Heart Disease Paternal Grandfather 54        massive heart attack    No Known Problems Mother     Mental Retardation Brother         autism    Hypertension Maternal Grandmother         Social History     Socioeconomic History    Marital status: SINGLE     Spouse name: Not on file    Number of children: Not on file    Years of education: Not on file    Highest education level: Not on file   Occupational History    Not on file   Tobacco Use    Smoking status: Former Smoker     Packs/day: 0.50     Years: 2.00     Pack years: 1.00     Types: Cigarettes    Smokeless tobacco: Never Used   Substance and Sexual Activity    Alcohol use: Yes     Alcohol/week: 0.0 standard drinks     Comment: socially    Drug use: Not Currently     Frequency: 1.0 times per week     Types: Marijuana    Sexual activity: Yes     Partners: Male     Birth control/protection: None   Other Topics Concern     Service No    Blood Transfusions No    Caffeine Concern Not Asked    Occupational Exposure No    Hobby Hazards Not Asked    Sleep Concern Not Asked    Stress Concern Not Asked    Weight Concern Not Asked    Special Diet Not Asked    Back Care Not Asked    Exercise No    Bike Helmet Not Asked    Seat Belt Yes    Self-Exams Yes   Social History Narrative    Not on file     Social Determinants of Health     Financial Resource Strain:     Difficulty of Paying Living Expenses:    Food Insecurity:     Worried About Running Out of Food in the Last Year:     Ran Out of Food in the Last Year:    Transportation Needs:     Lack of Transportation (Medical):      Lack of Transportation (Non-Medical):    Physical Activity:     Days of Exercise per Week:     Minutes of Exercise per Session:    Stress:     Feeling of Stress :    Social Connections:     Frequency of Communication with Friends and Family:     Frequency of Social Gatherings with Friends and Family:     Attends Restorationist Services:     Active Member of Clubs or Organizations:     Attends Club or Organization Meetings:     Marital Status:    Intimate Partner Violence:     Fear of Current or Ex-Partner:     Emotionally Abused:     Physically Abused:     Sexually Abused:         No Known Allergies      Current Outpatient Medications   Medication Sig Dispense Refill    propranolol LA (INDERAL LA) 60 mg SR capsule Take 1 Capsule by mouth daily for 30 days. 30 Capsule 3    clobetasol (TEMOVATE) 0.05 % external solution Use bid 50 mL 11         Physical Exam  Constitutional:       Appearance: Normal appearance. HENT:      Head: Atraumatic. Mouth/Throat:      Mouth: Mucous membranes are moist.      Pharynx: Oropharynx is clear. No oropharyngeal exudate. Eyes:      Extraocular Movements: Extraocular movements intact. Cardiovascular:      Rate and Rhythm: Tachycardia present. Heart sounds: No murmur heard. No gallop. Pulmonary:      Effort: Pulmonary effort is normal. No respiratory distress. Breath sounds: Normal breath sounds. No wheezing or rales. Musculoskeletal:         General: Normal range of motion. Cervical back: Normal range of motion and neck supple. Left lower leg: No edema. Neurological:      Mental Status: She is alert. Comments: Mental status: Awake, alert, oriented x3, follows simple, complex and inverted commands, no neglect, no extinction to DSS or VSS. Speech and languge: fluent, coherent,and comprehension intact  CN: VFF, EOMI, PERRLA, face sensation intact , no facial asymmetry noted, palate elevation symmetric bilat, SS+SCM 5/5 bilat, tongue midline  Motor: no pronator drift, tone normal throughout, strength 5/5 throughout  Sensory: intact to light touch throughout  Coordination: FNF, HS accurate w/o dysmetria. There is postural tremor of the UE and mild kinetic tremor. No head and neck tremor. DTR: 2+ throughout  Gait: normal.             No visits with results within 3 Month(s) from this visit.    Latest known visit with results is:   Orders Only on 03/13/2020   Component Date Value Ref Range Status    BACTERIAL VAGINOSIS, DALILA 03/13/2020 POSITIVE* Negative Final    C. albicans, DALILA 03/13/2020 POSITIVE* Negative Final    C. glabrata, DALILA 03/13/2020 Negative Negative Final    Stephanie krusei 03/13/2020 Negative  Negative Final    T. vaginalis, DALILA 03/13/2020 Negative  Negative Final    C. trachomatis, DALILA 03/13/2020 Negative  Negative Final    N. gonorrhoeae, DALILA 03/13/2020 Negative  Negative Final    Comment: Bacterial Vaginosis  Interpretation:  Positive     -      Consistent with Bacterial Vaginosis  Negative     -      No evidence of Bacterial Vaginosis  Intermediate -      Consistent with transition from normal vaginal [place]  Unspecified  -      Consistent with vaginal [place] alteration unrelated to                      Bacterial Vaginosis  This test is intended for use to aid in the diagnosis of bacterial vaginosis  (BV) in women with clinical symptoms consistent with this condition. This test  is a Real-time PCR-based assay used to detect common vaginal pathogens  associated with vaginitis/vaginosis (Atopobium vaginae and Gardnerella  vaginalis) in correlation with the ratio of lactobacillus to total bacteria  present in the sample. BV is associated with susceptibility to sexually transmitted diseases,  premature labor and delivery, and low-birth-weight infants. A. vaginae has  recently been implicated in BV, however it is also known to be a member of the  normal [place]. Therefore, t                           he detection of A. vaginae alone has a poor  predictive value; however, the presence of A. vaginae at high levels is a good  predictor of BV. G. vaginalis is a well-known bacterium that has been  implicated in BV. Normal [place] is dominated by a limited number of  Lactobacillus species which can be detected by our assay. A decrease in the  quantity of Lactobacillus species is one of the major characteristics   described  in BV. The coexistence of A. vaginae and G. vaginalis is a well-documented  characteristic of BV, whereas this association was only rarely present in  samples demonstrating normal [place].    Literature has shown that patients with  bacterial vaginosis were more likely to test positive for Neisseria   gonorrhoeae  and Chlamydia  trachomatis. Therefore, a positive bacterial vaginosis result may warrant  additional testing when clinically indicated. A positive result indicates that species implicated in BV infection are   present  in a sufficiently high ratio in compariso                           n to total bacteria. Importantly,   this  assay assesses the level of lactobacillus to monitor shifts in normal [place]  relative to the total bacteria present in the sample. A negative result  indicates that known BV causing organisms are absent or present at low  concentrations that did not alter normal [place] levels. An intermediate call is  made when there is a transition from normal [place] that has not reached the  level of BV infection. An unspecified call is made when there is a shift of  unknown significance. This can occur due to the presence of other organisms  not specifically tested for by this assay. Additional testing maybe warranted  in this case. An invalid call is made when the total bacteria level is  insufficient for analysis. Recollection is recommended if it is clinically  indicated. This is not an FDA approved test. This assay/method meets or exceeds the   standards established by CLIA. Documentation is on file at  [facility], [facility]                           ar Lab. Candida  This test is a Real-time PCR-based assay used to detect DNA from common   candida  species (Candida albicans, Candida glabrata, and Candida krusei) from vaginal  Aptima swabs on symptomatic women. A detected result indicates that Candida species-specific DNA is present. A   not  detected result indicates that Candida species-specific DNA is either absent   or  is present at a concentration below the detection limit for that species.   The  limit of detection determined by the [facility] laboratory  is 624 genomes per ml for C. albicans, 1654 genomes per ml C. glabrata, and 49  genomes per ml C. krusei. This is not an FDA-approved test. This assay/method meets or exceeds the   standards established by CLIA. Documentation is on file at  [facility]. SOURCE: Genital, vag               ICD-10-CM ICD-9-CM    1. Essential tremor  G25.0 333.1 propranolol LA (INDERAL LA) 60 mg SR capsule     She continues to have the postural and kinetic tremor of her upper extremities. She claims it interferes with her activities. On exam, it appears mild. Mild difficulty when doing the [place] and writing her name. Was on propranolol but only took it for about 2 weeks. Discussed the different options including observation. She currently prefers to try the propranolol again. But have told her to try it for a long time. We will start the propranolol long-acting 60 mg p.o. per day; will gradually increase the dose. We will see her in the clinic in 3 months time.

## 2022-12-27 ENCOUNTER — TELEPHONE (OUTPATIENT)
Dept: NEUROLOGY | Age: 32
End: 2022-12-27

## 2022-12-28 RX ORDER — PROPRANOLOL HYDROCHLORIDE 60 MG/1
60 CAPSULE, EXTENDED RELEASE ORAL DAILY
Qty: 30 CAPSULE | Refills: 3 | Status: SHIPPED | OUTPATIENT
Start: 2022-12-28

## 2022-12-28 RX ORDER — PROPRANOLOL HYDROCHLORIDE 60 MG/1
CAPSULE, EXTENDED RELEASE ORAL DAILY
COMMUNITY
End: 2022-12-28 | Stop reason: SDUPTHER

## 2024-09-06 ENCOUNTER — OFFICE VISIT (OUTPATIENT)
Facility: CLINIC | Age: 34
End: 2024-09-06

## 2024-09-06 ENCOUNTER — HOSPITAL ENCOUNTER (OUTPATIENT)
Facility: HOSPITAL | Age: 34
Setting detail: SPECIMEN
Discharge: HOME | End: 2024-09-09
Payer: COMMERCIAL

## 2024-09-06 VITALS
WEIGHT: 170.4 LBS | HEART RATE: 78 BPM | DIASTOLIC BLOOD PRESSURE: 75 MMHG | SYSTOLIC BLOOD PRESSURE: 122 MMHG | HEIGHT: 67 IN | RESPIRATION RATE: 16 BRPM | OXYGEN SATURATION: 99 % | BODY MASS INDEX: 26.74 KG/M2 | TEMPERATURE: 98.4 F

## 2024-09-06 DIAGNOSIS — G25.0 ESSENTIAL TREMOR: ICD-10-CM

## 2024-09-06 DIAGNOSIS — R53.83 FATIGUE, UNSPECIFIED TYPE: ICD-10-CM

## 2024-09-06 DIAGNOSIS — F41.1 GAD (GENERALIZED ANXIETY DISORDER): ICD-10-CM

## 2024-09-06 DIAGNOSIS — R00.2 PALPITATION: ICD-10-CM

## 2024-09-06 DIAGNOSIS — Z00.00 WELL ADULT EXAM: ICD-10-CM

## 2024-09-06 DIAGNOSIS — R06.83 SNORING: ICD-10-CM

## 2024-09-06 DIAGNOSIS — R00.2 PALPITATION: Primary | ICD-10-CM

## 2024-09-06 LAB
ALBUMIN SERPL-MCNC: 3.9 G/DL (ref 3.4–5)
ALBUMIN/GLOB SERPL: 1 (ref 0.8–1.7)
ALP SERPL-CCNC: 36 U/L (ref 45–117)
ALT SERPL-CCNC: 13 U/L (ref 13–56)
ANION GAP SERPL CALC-SCNC: 8 MMOL/L (ref 3–18)
AST SERPL-CCNC: 14 U/L (ref 10–38)
BASOPHILS # BLD: 0 K/UL (ref 0–0.1)
BASOPHILS NFR BLD: 1 % (ref 0–2)
BILIRUB SERPL-MCNC: 0.3 MG/DL (ref 0.2–1)
BUN SERPL-MCNC: 14 MG/DL (ref 7–18)
BUN/CREAT SERPL: 17 (ref 12–20)
CALCIUM SERPL-MCNC: 9.6 MG/DL (ref 8.5–10.1)
CHLORIDE SERPL-SCNC: 104 MMOL/L (ref 100–111)
CHOLEST SERPL-MCNC: 157 MG/DL
CO2 SERPL-SCNC: 26 MMOL/L (ref 21–32)
CREAT SERPL-MCNC: 0.84 MG/DL (ref 0.6–1.3)
DIFFERENTIAL METHOD BLD: ABNORMAL
EOSINOPHIL # BLD: 0.1 K/UL (ref 0–0.4)
EOSINOPHIL NFR BLD: 1 % (ref 0–5)
ERYTHROCYTE [DISTWIDTH] IN BLOOD BY AUTOMATED COUNT: 14.7 % (ref 11.6–14.5)
GLOBULIN SER CALC-MCNC: 3.8 G/DL (ref 2–4)
GLUCOSE SERPL-MCNC: 88 MG/DL (ref 74–99)
HCT VFR BLD AUTO: 39.8 % (ref 35–45)
HDLC SERPL-MCNC: 101 MG/DL (ref 40–60)
HDLC SERPL: 1.6 (ref 0–5)
HGB BLD-MCNC: 11.6 G/DL (ref 12–16)
IMM GRANULOCYTES # BLD AUTO: 0 K/UL (ref 0–0.04)
IMM GRANULOCYTES NFR BLD AUTO: 0 % (ref 0–0.5)
LDLC SERPL CALC-MCNC: 49 MG/DL (ref 0–100)
LIPID PANEL: ABNORMAL
LYMPHOCYTES # BLD: 1.7 K/UL (ref 0.9–3.6)
LYMPHOCYTES NFR BLD: 39 % (ref 21–52)
MCH RBC QN AUTO: 25.3 PG (ref 24–34)
MCHC RBC AUTO-ENTMCNC: 29.1 G/DL (ref 31–37)
MCV RBC AUTO: 86.7 FL (ref 78–100)
MONOCYTES # BLD: 0.6 K/UL (ref 0.05–1.2)
MONOCYTES NFR BLD: 13 % (ref 3–10)
NEUTS SEG # BLD: 2.1 K/UL (ref 1.8–8)
NEUTS SEG NFR BLD: 46 % (ref 40–73)
NRBC # BLD: 0 K/UL (ref 0–0.01)
NRBC BLD-RTO: 0 PER 100 WBC
PLATELET # BLD AUTO: 235 K/UL (ref 135–420)
PMV BLD AUTO: 11.8 FL (ref 9.2–11.8)
POTASSIUM SERPL-SCNC: 4.1 MMOL/L (ref 3.5–5.5)
PROT SERPL-MCNC: 7.7 G/DL (ref 6.4–8.2)
RBC # BLD AUTO: 4.59 M/UL (ref 4.2–5.3)
SODIUM SERPL-SCNC: 138 MMOL/L (ref 136–145)
TRIGL SERPL-MCNC: 35 MG/DL
TSH SERPL-ACNC: 0.9 UIU/ML (ref 0.36–3.74)
VLDLC SERPL CALC-MCNC: 7 MG/DL
WBC # BLD AUTO: 4.5 K/UL (ref 4.6–13.2)

## 2024-09-06 PROCEDURE — 84443 ASSAY THYROID STIM HORMONE: CPT

## 2024-09-06 PROCEDURE — 36415 COLL VENOUS BLD VENIPUNCTURE: CPT

## 2024-09-06 PROCEDURE — 80053 COMPREHEN METABOLIC PANEL: CPT

## 2024-09-06 PROCEDURE — 85025 COMPLETE CBC W/AUTO DIFF WBC: CPT

## 2024-09-06 PROCEDURE — 80061 LIPID PANEL: CPT

## 2024-09-06 RX ORDER — ESCITALOPRAM OXALATE 10 MG/1
10 TABLET ORAL DAILY
Qty: 30 TABLET | Refills: 3 | Status: SHIPPED | OUTPATIENT
Start: 2024-09-06

## 2024-09-06 SDOH — ECONOMIC STABILITY: FOOD INSECURITY: WITHIN THE PAST 12 MONTHS, THE FOOD YOU BOUGHT JUST DIDN'T LAST AND YOU DIDN'T HAVE MONEY TO GET MORE.: NEVER TRUE

## 2024-09-06 SDOH — ECONOMIC STABILITY: FOOD INSECURITY: WITHIN THE PAST 12 MONTHS, YOU WORRIED THAT YOUR FOOD WOULD RUN OUT BEFORE YOU GOT MONEY TO BUY MORE.: NEVER TRUE

## 2024-09-06 SDOH — ECONOMIC STABILITY: INCOME INSECURITY: HOW HARD IS IT FOR YOU TO PAY FOR THE VERY BASICS LIKE FOOD, HOUSING, MEDICAL CARE, AND HEATING?: SOMEWHAT HARD

## 2024-09-06 ASSESSMENT — PATIENT HEALTH QUESTIONNAIRE - PHQ9
SUM OF ALL RESPONSES TO PHQ9 QUESTIONS 1 & 2: 0
SUM OF ALL RESPONSES TO PHQ QUESTIONS 1-9: 0
SUM OF ALL RESPONSES TO PHQ QUESTIONS 1-9: 0
2. FEELING DOWN, DEPRESSED OR HOPELESS: NOT AT ALL
SUM OF ALL RESPONSES TO PHQ QUESTIONS 1-9: 0
SUM OF ALL RESPONSES TO PHQ QUESTIONS 1-9: 0
1. LITTLE INTEREST OR PLEASURE IN DOING THINGS: NOT AT ALL

## 2024-09-06 NOTE — PROGRESS NOTES
Subjective:  John is a 34 y.o. y.o. female who presents for following.     Wellness exam:  Diet include fruits and vegetables and low on processed foods sodas/juices/high sodium foods: no  Exercise 150 min per week: yes  Smoking: no, smokes marijuana  Alcohol abuse: no  Drug use: no  Dental exam annual done? No  Vision exam annual done?  No  Desire for birth control? No  Desire for STD screen? No  Family history of breast, prostate and/or colon cancer: no        Concern   Palpitation for years  No chest pain, sometimes sob, no lightheaded  Father and grandfather early death from heart disease   Pt has some anxiety , she has been doing therapy seen no difference  Pt snores at night and does not stop breath     Up-to-date  Up-to-date with immunization: No, declines flu shot and tetanus  Up-to-date with cancer screens: Yes pap smear last may normal  Patient has MyChart and/or sent text to phone to set it up?Yes    Safety:   Wear sunscreen: Yes      ROS:   General: no fever  CV:  no chest pain  Resp: no shortness of breath  AB: no  abdominal pain     Past Medical History:   Diagnosis Date    Coarse tremors     H/O trichomoniasis     Mild scoliosis       No past surgical history on file.   Social History     Socioeconomic History    Marital status: Single     Spouse name: None    Number of children: None    Years of education: None    Highest education level: None   Tobacco Use    Smoking status: Former     Current packs/day: 0.50     Types: Cigarettes    Smokeless tobacco: Never   Vaping Use    Vaping status: Never Used   Substance and Sexual Activity    Alcohol use: Yes     Alcohol/week: 0.0 standard drinks of alcohol    Drug use: Not Currently     Frequency: 1.0 times per week     Types: Marijuana (Weed)    Sexual activity: Yes     Birth control/protection: None     Social Determinants of Health     Financial Resource Strain: Medium Risk (9/6/2024)    Overall Financial Resource Strain (CARDIA)     Difficulty of

## 2024-09-06 NOTE — PROGRESS NOTES
1. \"Have you been to the ER, urgent care clinic since your last visit?  Hospitalized since your last visit?\" No    2. \"Have you seen or consulted any other health care providers outside of the [facility] since your last visit?\"  Yes neurologist       3. For patients aged 45-75: Has the patient had a colonoscopy / FIT/ Cologuard? NA - based on age      If the patient is female:    4. For patients aged 40-74: Has the patient had a mammogram within the past 2 years? NA - based on age or sex      5. For patients aged 21-65: Has the patient had a pap smear? No